# Patient Record
Sex: FEMALE | Race: BLACK OR AFRICAN AMERICAN | Employment: UNEMPLOYED | URBAN - METROPOLITAN AREA
[De-identification: names, ages, dates, MRNs, and addresses within clinical notes are randomized per-mention and may not be internally consistent; named-entity substitution may affect disease eponyms.]

---

## 2024-01-16 ENCOUNTER — APPOINTMENT (OUTPATIENT)
Dept: GENERAL RADIOLOGY | Age: 59
End: 2024-01-16
Payer: MEDICARE

## 2024-01-16 ENCOUNTER — HOSPITAL ENCOUNTER (EMERGENCY)
Age: 59
Discharge: HOME | End: 2024-01-16
Payer: MEDICARE

## 2024-01-16 VITALS
OXYGEN SATURATION: 98 % | TEMPERATURE: 98.9 F | RESPIRATION RATE: 14 BRPM | DIASTOLIC BLOOD PRESSURE: 102 MMHG | SYSTOLIC BLOOD PRESSURE: 113 MMHG | HEART RATE: 88 BPM

## 2024-01-16 DIAGNOSIS — R05.9 COUGH, UNSPECIFIED TYPE: Primary | ICD-10-CM

## 2024-01-16 DIAGNOSIS — Z20.828 EXPOSURE TO INFLUENZA: ICD-10-CM

## 2024-01-16 LAB
FLUAV RNA RESP QL NAA+PROBE: NOT DETECTED
FLUBV RNA RESP QL NAA+PROBE: NOT DETECTED
SARS-COV-2 RNA RESP QL NAA+PROBE: NOT DETECTED

## 2024-01-16 PROCEDURE — 87636 SARSCOV2 & INF A&B AMP PRB: CPT

## 2024-01-16 PROCEDURE — 99284 EMERGENCY DEPT VISIT MOD MDM: CPT

## 2024-01-16 PROCEDURE — 71045 X-RAY EXAM CHEST 1 VIEW: CPT

## 2024-01-16 RX ORDER — FUROSEMIDE 20 MG/1
10 TABLET ORAL
COMMUNITY

## 2024-01-16 RX ORDER — GUAIFENESIN 200 MG/10ML
200 LIQUID ORAL EVERY 4 HOURS PRN
COMMUNITY

## 2024-01-16 RX ORDER — ASPIRIN 81 MG/1
81 TABLET, CHEWABLE ORAL NIGHTLY
COMMUNITY

## 2024-01-16 RX ORDER — ZOLPIDEM TARTRATE 10 MG/1
10 TABLET ORAL NIGHTLY
COMMUNITY
Start: 2023-05-18

## 2024-01-16 RX ORDER — RISPERIDONE 1 MG/1
1 TABLET ORAL EVERY MORNING
COMMUNITY
Start: 2022-10-03

## 2024-01-16 RX ORDER — LOPERAMIDE HYDROCHLORIDE 2 MG/1
4 CAPSULE ORAL PRN
COMMUNITY

## 2024-01-16 RX ORDER — RISPERIDONE 2 MG/1
2 TABLET ORAL NIGHTLY
COMMUNITY
Start: 2022-10-03

## 2024-01-16 RX ORDER — DULOXETIN HYDROCHLORIDE 60 MG/1
60 CAPSULE, DELAYED RELEASE ORAL 2 TIMES DAILY
COMMUNITY
Start: 2022-10-03

## 2024-01-16 RX ORDER — LORATADINE 10 MG/1
10 TABLET ORAL EVERY MORNING
COMMUNITY

## 2024-01-16 RX ORDER — ASPIRIN 81 MG
100 TABLET, DELAYED RELEASE (ENTERIC COATED) ORAL DAILY
COMMUNITY

## 2024-01-16 ASSESSMENT — ENCOUNTER SYMPTOMS
DIARRHEA: 0
VOMITING: 0
COUGH: 1
RHINORRHEA: 0
SHORTNESS OF BREATH: 0
NAUSEA: 0
ABDOMINAL PAIN: 0

## 2024-01-17 NOTE — ED PROVIDER NOTES
well-appearing can be managed as nursing home facility.  Recommend that she get retested within 2 to 3 days if she continues with her symptoms.  She is to return to the ED for new or worsening symptoms, stable for discharge, suspicion is low at this time for pneumonia, pleural effusion, pneumothorax, acute Rester distress, hypoxemia or other emergent etiology    I am the Primary Clinician of Record.  FINAL IMPRESSION      1. Cough, unspecified type    2. Exposure to influenza          DISPOSITION/PLAN     DISPOSITION Decision To Discharge 01/16/2024 09:54:39 PM      PATIENT REFERRED TO:  [facility]  [phone]  Schedule an appointment as soon as possible for a visit in 2 days      [facility] Emergency Department  [address] [address]  [phone]    As needed, If symptoms worsen      DISCHARGE MEDICATIONS:  New Prescriptions    No medications on file       DISCONTINUED MEDICATIONS:  Discontinued Medications    No medications on file              (Please note that portions of this note were completed with a voice recognition program.  Efforts were made to edit the dictations but occasionally words are mis-transcribed.)    Shelby Quintana PA-C (electronically signed)        Shelby Quintana PA-C  01/16/24 [id number]

## 2024-03-02 ENCOUNTER — HOSPITAL ENCOUNTER (EMERGENCY)
Age: 59
Discharge: HOME | End: 2024-03-02
Attending: EMERGENCY MEDICINE
Payer: MEDICARE

## 2024-03-02 VITALS
HEART RATE: 65 BPM | TEMPERATURE: 97.9 F | SYSTOLIC BLOOD PRESSURE: 135 MMHG | HEIGHT: 55 IN | WEIGHT: 185 LBS | RESPIRATION RATE: 16 BRPM | DIASTOLIC BLOOD PRESSURE: 74 MMHG | OXYGEN SATURATION: 100 % | BODY MASS INDEX: 42.81 KG/M2

## 2024-03-02 DIAGNOSIS — N39.0 URINARY TRACT INFECTION IN FEMALE: Primary | ICD-10-CM

## 2024-03-02 LAB
ALBUMIN SERPL-MCNC: 3.8 G/DL (ref 3.4–5)
ALBUMIN/GLOB SERPL: 0.9 {RATIO} (ref 1.1–2.2)
ALP SERPL-CCNC: 89 U/L (ref 40–129)
ALT SERPL-CCNC: 28 U/L (ref 10–40)
ANION GAP SERPL CALCULATED.3IONS-SCNC: 10 MMOL/L (ref 3–16)
AST SERPL-CCNC: 40 U/L (ref 15–37)
BACTERIA URNS QL MICRO: ABNORMAL /HPF
BASOPHILS # BLD: 0.1 K/UL (ref 0–0.2)
BASOPHILS NFR BLD: 1 %
BILIRUB SERPL-MCNC: 0.3 MG/DL (ref 0–1)
BILIRUB UR QL STRIP.AUTO: NEGATIVE
BUN SERPL-MCNC: 17 MG/DL (ref 7–20)
CALCIUM SERPL-MCNC: 9.7 MG/DL (ref 8.3–10.6)
CHLORIDE SERPL-SCNC: 101 MMOL/L (ref 99–110)
CLARITY UR: ABNORMAL
CO2 SERPL-SCNC: 24 MMOL/L (ref 21–32)
COLOR UR: YELLOW
CREAT SERPL-MCNC: 0.7 MG/DL (ref 0.6–1.1)
DEPRECATED RDW RBC AUTO: 14.2 % (ref 12.4–15.4)
EOSINOPHIL # BLD: 0.1 K/UL (ref 0–0.6)
EOSINOPHIL NFR BLD: 2.2 %
EPI CELLS #/AREA URNS AUTO: 1 /HPF (ref 0–5)
GFR SERPLBLD CREATININE-BSD FMLA CKD-EPI: >60 ML/MIN/{1.73_M2}
GLUCOSE SERPL-MCNC: 85 MG/DL (ref 70–99)
GLUCOSE UR STRIP.AUTO-MCNC: NEGATIVE MG/DL
HCT VFR BLD AUTO: 37.9 % (ref 36–48)
HGB BLD-MCNC: 12.6 G/DL (ref 12–16)
HGB UR QL STRIP.AUTO: NEGATIVE
HYALINE CASTS #/AREA URNS AUTO: 0 /LPF (ref 0–8)
KETONES UR STRIP.AUTO-MCNC: NEGATIVE MG/DL
LEUKOCYTE ESTERASE UR QL STRIP.AUTO: ABNORMAL
LYMPHOCYTES # BLD: 1.5 K/UL (ref 1–5.1)
LYMPHOCYTES NFR BLD: 23.5 %
MCH RBC QN AUTO: 30 PG (ref 26–34)
MCHC RBC AUTO-ENTMCNC: 33.3 G/DL (ref 31–36)
MCV RBC AUTO: 90.2 FL (ref 80–100)
MONOCYTES # BLD: 0.7 K/UL (ref 0–1.3)
MONOCYTES NFR BLD: 10.8 %
NEUTROPHILS # BLD: 4 K/UL (ref 1.7–7.7)
NEUTROPHILS NFR BLD: 62.5 %
NITRITE UR QL STRIP.AUTO: POSITIVE
PH UR STRIP.AUTO: 6.5 [PH] (ref 5–8)
PLATELET # BLD AUTO: 311 K/UL (ref 135–450)
PMV BLD AUTO: 7 FL (ref 5–10.5)
POTASSIUM SERPL-SCNC: 5.4 MMOL/L (ref 3.5–5.1)
PROT SERPL-MCNC: 8 G/DL (ref 6.4–8.2)
PROT UR STRIP.AUTO-MCNC: NEGATIVE MG/DL
RBC # BLD AUTO: 4.2 M/UL (ref 4–5.2)
RBC CLUMPS #/AREA URNS AUTO: 0 /HPF (ref 0–4)
SODIUM SERPL-SCNC: 135 MMOL/L (ref 136–145)
SP GR UR STRIP.AUTO: 1.01 (ref 1–1.03)
UA COMPLETE W REFLEX CULTURE PNL UR: YES
UA DIPSTICK W REFLEX MICRO PNL UR: YES
URN SPEC COLLECT METH UR: ABNORMAL
UROBILINOGEN UR STRIP-ACNC: 0.2 E.U./DL
WBC # BLD AUTO: 6.4 K/UL (ref 4–11)
WBC #/AREA URNS AUTO: 110 /HPF (ref 0–5)

## 2024-03-02 PROCEDURE — 99284 EMERGENCY DEPT VISIT MOD MDM: CPT

## 2024-03-02 PROCEDURE — 87086 URINE CULTURE/COLONY COUNT: CPT

## 2024-03-02 PROCEDURE — 81001 URINALYSIS AUTO W/SCOPE: CPT

## 2024-03-02 PROCEDURE — 96365 THER/PROPH/DIAG IV INF INIT: CPT

## 2024-03-02 PROCEDURE — 85025 COMPLETE CBC W/AUTO DIFF WBC: CPT

## 2024-03-02 PROCEDURE — 87077 CULTURE AEROBIC IDENTIFY: CPT

## 2024-03-02 PROCEDURE — 36415 COLL VENOUS BLD VENIPUNCTURE: CPT

## 2024-03-02 PROCEDURE — 87186 SC STD MICRODIL/AGAR DIL: CPT

## 2024-03-02 PROCEDURE — 2580000003 HC RX 258: Performed by: EMERGENCY MEDICINE

## 2024-03-02 PROCEDURE — 80053 COMPREHEN METABOLIC PANEL: CPT

## 2024-03-02 PROCEDURE — 6360000002 HC RX W HCPCS: Performed by: EMERGENCY MEDICINE

## 2024-03-02 RX ORDER — CEFUROXIME AXETIL 500 MG/1
500 TABLET ORAL 2 TIMES DAILY
Qty: 14 TABLET | Refills: 0 | Status: SHIPPED | OUTPATIENT
Start: 2024-03-02 | End: 2024-03-09

## 2024-03-02 RX ADMIN — CEFTRIAXONE SODIUM 1000 MG: 1 INJECTION, POWDER, FOR SOLUTION INTRAMUSCULAR; INTRAVENOUS at 11:20

## 2024-03-02 NOTE — ED NOTES
Pt ambulated out to hallway with walker, patient was nervous her knees would buckle however did really well. Pt used walker and has a walker at home and wheelchair to get around while she doesn't feel good the next couple days.

## 2024-03-02 NOTE — ED PROVIDER NOTES
[facility] EMERGENCY DEPARTMENT  EMERGENCY DEPARTMENTENCOUNTER      Pt Name: Aster Jauregui  MRN: [id number]  Birthdate 1965  Date ofevaluation: 3/2/2024  Provider: Campbell Bhatia MD    CHIEF COMPLAINT       Chief Complaint   Patient presents with    Fall     [facility] patient states her legs keep buckling. Patient denies any loc, denies pain anywhere.        HPI    HISTORY OF PRESENT ILLNESS   (Location/Symptom, Timing/Onset,Context/Setting, Quality, Duration, Modifying Factors, Severity)  Note limiting factors.   Aster Jauregui is a 58 y.o. female who presents to the emergency department with weakness.  This is a 58-year-old female who states that over the last several days she feels like her legs are giving out.  She denies any fevers or chills.  The patient does have a history of urinary tract infections.  The patient states this happens when she walks long distances.  She denies any abdominal pain.        NursingNotes were reviewed.    Review of Systems    REVIEW OF SYSTEMS    (2-9 systems for level 4, 10 or more for level 5)     Review of Systems   Constitutional: Negative for fever.   HENT: Negative for rhinorrhea and sore throat.    Eyes: Negative for redness.   Respiratory: Negative for shortness of breath.    Cardiovascular: Negative for chest pain.   Gastrointestinal: Negative for abdominal pain.   Genitourinary: Negative for flank pain.   Neurological: Negative for headaches.   Hematological: Negative for adenopathy.   Psychiatric/Behavioral: Negative for confusion.              Except as noted above the remainder of the review of systems was reviewed and negative.       PAST MEDICAL HISTORY   History reviewed. No pertinent past medical history.      SURGICALHISTORY     History reviewed. No pertinent surgical history.      CURRENT MEDICATIONS       Previous Medications    ASPIRIN 81 MG CHEWABLE TABLET    Take 1 tablet by mouth nightly    DOCUSATE  SODIUM 100 MG TABS    Take 100 mg by mouth daily    DULOXETINE (CYMBALTA) 60 MG EXTENDED RELEASE CAPSULE    Take 1 capsule by mouth 2 times daily    FUROSEMIDE (LASIX) 20 MG TABLET    Take 0.5 tablets by mouth every 48 hours    GUAIFENESIN (ROBITUSSIN) 100 MG/5ML LIQUID    Take 10 mLs by mouth every 4 hours as needed    LOPERAMIDE (IMODIUM) 2 MG CAPSULE    Take 2 capsules by mouth as needed    LORATADINE (CLARITIN) 10 MG TABLET    Take 1 tablet by mouth every morning    RISPERIDONE (RISPERDAL) 1 MG TABLET    Take 1 tablet by mouth every morning    RISPERIDONE (RISPERDAL) 2 MG TABLET    Take 1 tablet by mouth nightly    SENNOSIDES 8.6 MG CAPS    Take 8.6 mg by mouth daily    ZOLPIDEM (AMBIEN) 10 MG TABLET    Take 1 tablet by mouth nightly.       ALLERGIES     Patient has no known allergies.    FAMILY HISTORY     History reviewed. No pertinent family history.       SOCIAL HISTORY       Social History     Socioeconomic History    Marital status: Unknown     Spouse name: None    Number of children: None    Years of education: None    Highest education level: None   Tobacco Use    Smoking status: Never     Passive exposure: Never    Smokeless tobacco: Never   Substance and Sexual Activity    Alcohol use: Never    Drug use: Never    Sexual activity: Never       SCREENINGS    Megan Coma Scale  Eye Opening: Spontaneous  Best Verbal Response: Oriented  Best Motor Response: Obeys commands  Megan Coma Scale Score: 15        PHYSICAL EXAM    (up to 7 for level 4, 8 or more for level 5)     ED Triage Vitals [03/02/24 0817]   BP Temp Temp src Pulse Respirations SpO2 Height Weight - Scale   135/74 97.9 °F (36.6 °C) -- 65 16 100 % 1.372 m (4' 6\") 83.9 kg (185 lb)       Physical Exam:      General Appearance:  Alert, cooperative, appears stated age.   Head:  Normocephalic, without obvious abnormality, atraumatic.   Eyes:  conjunctiva/corneas clear, EOM's intact.  Sclera anicteric.   ENT: Mucous membranes are moist and pink

## 2024-03-02 NOTE — ED NOTES
Reviewed discharge instructions with patient, patient verbalized understanding, used wheelchair to get to car for family to take back to [facility].

## 2024-03-03 LAB
BACTERIA UR CULT: ABNORMAL
ORGANISM: ABNORMAL

## 2024-03-04 LAB
BACTERIA UR CULT: ABNORMAL
ORGANISM: ABNORMAL

## 2024-03-05 ENCOUNTER — APPOINTMENT (OUTPATIENT)
Dept: CT IMAGING | Age: 59
End: 2024-03-05
Payer: MEDICARE

## 2024-03-05 ENCOUNTER — APPOINTMENT (OUTPATIENT)
Dept: GENERAL RADIOLOGY | Age: 59
End: 2024-03-05
Payer: MEDICARE

## 2024-03-05 ENCOUNTER — HOSPITAL ENCOUNTER (EMERGENCY)
Age: 59
Discharge: HOME | End: 2024-03-06
Attending: EMERGENCY MEDICINE
Payer: MEDICARE

## 2024-03-05 VITALS
HEART RATE: 67 BPM | OXYGEN SATURATION: 100 % | DIASTOLIC BLOOD PRESSURE: 70 MMHG | RESPIRATION RATE: 18 BRPM | SYSTOLIC BLOOD PRESSURE: 121 MMHG | TEMPERATURE: 97.5 F

## 2024-03-05 DIAGNOSIS — S09.90XA CLOSED HEAD INJURY, INITIAL ENCOUNTER: Primary | ICD-10-CM
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
## 2024-03-05 LAB
ALBUMIN SERPL-MCNC: 3.9 G/DL (ref 3.4–5)
ALBUMIN/GLOB SERPL: 0.9 {RATIO} (ref 1.1–2.2)
ALP SERPL-CCNC: 94 U/L (ref 40–129)
ALT SERPL-CCNC: 29 U/L (ref 10–40)
ANION GAP SERPL CALCULATED.3IONS-SCNC: 7 MMOL/L (ref 3–16)
AST SERPL-CCNC: 28 U/L (ref 15–37)
BACTERIA URNS QL MICRO: ABNORMAL /HPF
BASOPHILS # BLD: 0.1 K/UL (ref 0–0.2)
BASOPHILS NFR BLD: 1.1 %
BILIRUB SERPL-MCNC: <0.2 MG/DL (ref 0–1)
BILIRUB UR QL STRIP.AUTO: NEGATIVE
BUN SERPL-MCNC: 16 MG/DL (ref 7–20)
CALCIUM SERPL-MCNC: 9.9 MG/DL (ref 8.3–10.6)
CHLORIDE SERPL-SCNC: 101 MMOL/L (ref 99–110)
CLARITY UR: CLEAR
CO2 SERPL-SCNC: 28 MMOL/L (ref 21–32)
COLOR UR: YELLOW
CREAT SERPL-MCNC: 0.9 MG/DL (ref 0.6–1.1)
DEPRECATED RDW RBC AUTO: 14.1 % (ref 12.4–15.4)
EOSINOPHIL # BLD: 0.2 K/UL (ref 0–0.6)
EOSINOPHIL NFR BLD: 2.6 %
EPI CELLS #/AREA URNS AUTO: 0 /HPF (ref 0–5)
GFR SERPLBLD CREATININE-BSD FMLA CKD-EPI: >60 ML/MIN/{1.73_M2}
GLUCOSE SERPL-MCNC: 96 MG/DL (ref 70–99)
GLUCOSE UR STRIP.AUTO-MCNC: NEGATIVE MG/DL
HCT VFR BLD AUTO: 38.4 % (ref 36–48)
HGB BLD-MCNC: 12.9 G/DL (ref 12–16)
HGB UR QL STRIP.AUTO: NEGATIVE
HYALINE CASTS #/AREA URNS AUTO: 0 /LPF (ref 0–8)
KETONES UR STRIP.AUTO-MCNC: NEGATIVE MG/DL
LACTATE BLDV-SCNC: 1.7 MMOL/L (ref 0.4–1.9)
LEUKOCYTE ESTERASE UR QL STRIP.AUTO: ABNORMAL
LYMPHOCYTES # BLD: 2 K/UL (ref 1–5.1)
LYMPHOCYTES NFR BLD: 31.4 %
MCH RBC QN AUTO: 30.1 PG (ref 26–34)
MCHC RBC AUTO-ENTMCNC: 33.6 G/DL (ref 31–36)
MCV RBC AUTO: 89.5 FL (ref 80–100)
MONOCYTES # BLD: 0.6 K/UL (ref 0–1.3)
MONOCYTES NFR BLD: 8.8 %
NEUTROPHILS # BLD: 3.6 K/UL (ref 1.7–7.7)
NEUTROPHILS NFR BLD: 56.1 %
NITRITE UR QL STRIP.AUTO: NEGATIVE
NT-PROBNP SERPL-MCNC: 41 PG/ML (ref 0–124)
PH UR STRIP.AUTO: 7 [PH] (ref 5–8)
PLATELET # BLD AUTO: 335 K/UL (ref 135–450)
PMV BLD AUTO: 7.1 FL (ref 5–10.5)
POTASSIUM SERPL-SCNC: 4.1 MMOL/L (ref 3.5–5.1)
PROCALCITONIN SERPL IA-MCNC: 0.06 NG/ML (ref 0–0.15)
PROT SERPL-MCNC: 8.1 G/DL (ref 6.4–8.2)
PROT UR STRIP.AUTO-MCNC: NEGATIVE MG/DL
RBC # BLD AUTO: 4.29 M/UL (ref 4–5.2)
RBC CLUMPS #/AREA URNS AUTO: 0 /HPF (ref 0–4)
REASON FOR REJECTION: NORMAL
REJECTED TEST: NORMAL
SODIUM SERPL-SCNC: 136 MMOL/L (ref 136–145)
SP GR UR STRIP.AUTO: 1.01 (ref 1–1.03)
TROPONIN, HIGH SENSITIVITY: 13 NG/L (ref 0–14)
TROPONIN, HIGH SENSITIVITY: 14 NG/L (ref 0–14)
UA COMPLETE W REFLEX CULTURE PNL UR: YES
UA DIPSTICK W REFLEX MICRO PNL UR: YES
URN SPEC COLLECT METH UR: ABNORMAL
UROBILINOGEN UR STRIP-ACNC: 0.2 E.U./DL
WBC # BLD AUTO: 6.3 K/UL (ref 4–11)
WBC #/AREA URNS AUTO: 16 /HPF (ref 0–5)

## 2024-03-05 PROCEDURE — 99285 EMERGENCY DEPT VISIT HI MDM: CPT

## 2024-03-05 PROCEDURE — 80053 COMPREHEN METABOLIC PANEL: CPT

## 2024-03-05 PROCEDURE — 72125 CT NECK SPINE W/O DYE: CPT

## 2024-03-05 PROCEDURE — 87086 URINE CULTURE/COLONY COUNT: CPT

## 2024-03-05 PROCEDURE — 83880 ASSAY OF NATRIURETIC PEPTIDE: CPT

## 2024-03-05 PROCEDURE — 81001 URINALYSIS AUTO W/SCOPE: CPT

## 2024-03-05 PROCEDURE — 6370000000 HC RX 637 (ALT 250 FOR IP): Performed by: PHYSICIAN ASSISTANT

## 2024-03-05 PROCEDURE — 71045 X-RAY EXAM CHEST 1 VIEW: CPT

## 2024-03-05 PROCEDURE — 84484 ASSAY OF TROPONIN QUANT: CPT

## 2024-03-05 PROCEDURE — 83605 ASSAY OF LACTIC ACID: CPT

## 2024-03-05 PROCEDURE — 93005 ELECTROCARDIOGRAM TRACING: CPT | Performed by: PHYSICIAN ASSISTANT

## 2024-03-05 PROCEDURE — 70486 CT MAXILLOFACIAL W/O DYE: CPT

## 2024-03-05 PROCEDURE — 85025 COMPLETE CBC W/AUTO DIFF WBC: CPT

## 2024-03-05 PROCEDURE — 84145 PROCALCITONIN (PCT): CPT

## 2024-03-05 PROCEDURE — 70450 CT HEAD/BRAIN W/O DYE: CPT

## 2024-03-05 RX ORDER — ACETAMINOPHEN 500 MG
1000 TABLET ORAL ONCE
Status: COMPLETED | OUTPATIENT
Start: 2024-03-05 | End: 2024-03-05

## 2024-03-05 RX ADMIN — ACETAMINOPHEN 1000 MG: 500 TABLET ORAL at 20:55

## 2024-03-05 ASSESSMENT — ENCOUNTER SYMPTOMS
DIARRHEA: 0
COUGH: 0
RHINORRHEA: 0
SHORTNESS OF BREATH: 0
ABDOMINAL PAIN: 0
NAUSEA: 0
VOMITING: 0

## 2024-03-05 ASSESSMENT — PAIN DESCRIPTION - LOCATION
LOCATION: GENERALIZED
LOCATION: GENERALIZED

## 2024-03-05 ASSESSMENT — PAIN DESCRIPTION - DESCRIPTORS
DESCRIPTORS: ACHING
DESCRIPTORS: ACHING

## 2024-03-05 ASSESSMENT — PAIN SCALES - GENERAL
PAINLEVEL_OUTOF10: 2
PAINLEVEL_OUTOF10: 10
PAINLEVEL_OUTOF10: 3

## 2024-03-05 ASSESSMENT — PAIN - FUNCTIONAL ASSESSMENT: PAIN_FUNCTIONAL_ASSESSMENT: 0-10

## 2024-03-05 ASSESSMENT — LIFESTYLE VARIABLES
HOW MANY STANDARD DRINKS CONTAINING ALCOHOL DO YOU HAVE ON A TYPICAL DAY: PATIENT DOES NOT DRINK
HOW OFTEN DO YOU HAVE A DRINK CONTAINING ALCOHOL: NEVER

## 2024-03-06 LAB
EKG ATRIAL RATE: 66 BPM
EKG DIAGNOSIS: NORMAL
EKG P AXIS: 31 DEGREES
EKG P-R INTERVAL: 132 MS
EKG Q-T INTERVAL: 446 MS
EKG QRS DURATION: 90 MS
EKG QTC CALCULATION (BAZETT): 467 MS
EKG R AXIS: 23 DEGREES
EKG T AXIS: 32 DEGREES
EKG VENTRICULAR RATE: 66 BPM

## 2024-03-06 PROCEDURE — 93010 ELECTROCARDIOGRAM REPORT: CPT | Performed by: INTERNAL MEDICINE

## 2024-03-06 NOTE — ED PROVIDER NOTES
was regular rate rhythm with no murmurs rubs gallops.  She was neurologic tact with no focal motor or sensory deficits throughout.      Medical decision making:    The patient has remained stable throughout hospital course.  Her evaluation was unremarkable and normal.  Multiple imaging studies were obtained including her head and neck and her chest that are unremarkable and normal.  A cardiac workup was normal.  A urinalysis was obtained that shows a improving urinary tract infection.  The patient was discharged with appropriate follow-up and instructions to return if worse.    Is this patient to be included in the SEP-1 Core Measure due to severe sepsis or septic shock?   No   Exclusion criteria - the patient is NOT to be included for SEP-1 Core Measure due to:  Infection is not suspected      FINAL IMPRESSION:    1. Closed head injury, initial encounter    2. Fall, initial encounter            Campbell Bhatia MD  03/05/24 1067    
return.    Final Impression    1. Closed head injury, initial encounter    2. Fall, initial encounter        Discharge Vital Signs:  Blood pressure 135/77, pulse 67, temperature 97.5 °F (36.4 °C), temperature source Oral, resp. rate 18, SpO2 100 %.      I am the Primary Clinician of Record.  FINAL IMPRESSION      1. Closed head injury, initial encounter    2. Fall, initial encounter          DISPOSITION/PLAN     DISPOSITION Decision To Discharge 03/05/2024 11:13:23 PM      PATIENT REFERRED TO:  [facility] Pre-Services  [phone]  Schedule an appointment as soon as possible for a visit in 2 days      [facility] Emergency Department  [address] [address]  [phone]    As needed, If symptoms worsen      DISCHARGE MEDICATIONS:  New Prescriptions    MISC. DEVICES (WALKER) MISC    1 each by Does not apply route daily       DISCONTINUED MEDICATIONS:  Discontinued Medications    No medications on file              (Please note that portions of this note were completed with a voice recognition program.  Efforts were made to edit the dictations but occasionally words are mis-transcribed.)    Shelby Quintana PA-C (electronically signed)        Shelby Quintana PA-C  03/05/24 6288

## 2024-03-06 NOTE — ED NOTES
Transport to be here around 0130 to transport patient home. Snacks provided to patient. Updated on  time. Call light in reach. Bed in lowest position.

## 2024-03-07 LAB — BACTERIA UR CULT: NORMAL

## 2024-05-13 ENCOUNTER — HOSPITAL ENCOUNTER (EMERGENCY)
Age: 59
Discharge: HOME | DRG: 313 | End: 2024-05-14
Payer: MEDICARE

## 2024-05-13 DIAGNOSIS — R07.9 CHEST PAIN, UNSPECIFIED TYPE: Primary | ICD-10-CM

## 2024-05-13 PROCEDURE — 99285 EMERGENCY DEPT VISIT HI MDM: CPT

## 2024-05-13 PROCEDURE — 93005 ELECTROCARDIOGRAM TRACING: CPT | Performed by: NURSE PRACTITIONER

## 2024-05-13 ASSESSMENT — LIFESTYLE VARIABLES
HOW OFTEN DO YOU HAVE A DRINK CONTAINING ALCOHOL: NEVER
HOW MANY STANDARD DRINKS CONTAINING ALCOHOL DO YOU HAVE ON A TYPICAL DAY: PATIENT DOES NOT DRINK

## 2024-05-13 ASSESSMENT — PAIN DESCRIPTION - LOCATION: LOCATION: CHEST

## 2024-05-13 ASSESSMENT — PAIN - FUNCTIONAL ASSESSMENT: PAIN_FUNCTIONAL_ASSESSMENT: 0-10

## 2024-05-13 ASSESSMENT — PAIN SCALES - GENERAL: PAINLEVEL_OUTOF10: 10

## 2024-05-13 ASSESSMENT — PAIN DESCRIPTION - DESCRIPTORS: DESCRIPTORS: TIGHTNESS;PRESSURE

## 2024-05-14 ENCOUNTER — APPOINTMENT (OUTPATIENT)
Dept: GENERAL RADIOLOGY | Age: 59
DRG: 313 | End: 2024-05-14
Payer: MEDICARE

## 2024-05-14 ENCOUNTER — APPOINTMENT (OUTPATIENT)
Dept: CT IMAGING | Age: 59
DRG: 313 | End: 2024-05-14
Payer: MEDICARE

## 2024-05-14 VITALS
BODY MASS INDEX: 41.89 KG/M2 | RESPIRATION RATE: 15 BRPM | HEIGHT: 55 IN | HEART RATE: 53 BPM | WEIGHT: 181 LBS | DIASTOLIC BLOOD PRESSURE: 66 MMHG | OXYGEN SATURATION: 100 % | TEMPERATURE: 97.7 F | SYSTOLIC BLOOD PRESSURE: 124 MMHG

## 2024-05-14 LAB
ALBUMIN SERPL-MCNC: 3.4 G/DL (ref 3.4–5)
ALBUMIN/GLOB SERPL: 0.9 {RATIO} (ref 1.1–2.2)
ALP SERPL-CCNC: 103 U/L (ref 40–129)
ALT SERPL-CCNC: 45 U/L (ref 10–40)
ANION GAP SERPL CALCULATED.3IONS-SCNC: 7 MMOL/L (ref 3–16)
AST SERPL-CCNC: 41 U/L (ref 15–37)
BASOPHILS # BLD: 0.1 K/UL (ref 0–0.2)
BASOPHILS NFR BLD: 0.9 %
BILIRUB SERPL-MCNC: <0.2 MG/DL (ref 0–1)
BUN SERPL-MCNC: 15 MG/DL (ref 7–20)
CALCIUM SERPL-MCNC: 9.5 MG/DL (ref 8.3–10.6)
CHLORIDE SERPL-SCNC: 103 MMOL/L (ref 99–110)
CO2 SERPL-SCNC: 27 MMOL/L (ref 21–32)
CREAT SERPL-MCNC: 0.8 MG/DL (ref 0.6–1.1)
D DIMER: 0.53 UG/ML FEU (ref 0–0.6)
DEPRECATED RDW RBC AUTO: 13.2 % (ref 12.4–15.4)
EKG ATRIAL RATE: 57 BPM
EKG DIAGNOSIS: NORMAL
EKG P AXIS: 32 DEGREES
EKG P-R INTERVAL: 148 MS
EKG Q-T INTERVAL: 464 MS
EKG QRS DURATION: 90 MS
EKG QTC CALCULATION (BAZETT): 451 MS
EKG R AXIS: 26 DEGREES
EKG T AXIS: 19 DEGREES
EKG VENTRICULAR RATE: 57 BPM
EOSINOPHIL # BLD: 0.2 K/UL (ref 0–0.6)
EOSINOPHIL NFR BLD: 2.8 %
GFR SERPLBLD CREATININE-BSD FMLA CKD-EPI: 85 ML/MIN/{1.73_M2}
GLUCOSE SERPL-MCNC: 93 MG/DL (ref 70–99)
HCT VFR BLD AUTO: 33.8 % (ref 36–48)
HGB BLD-MCNC: 11.3 G/DL (ref 12–16)
LYMPHOCYTES # BLD: 1.9 K/UL (ref 1–5.1)
LYMPHOCYTES NFR BLD: 32 %
MCH RBC QN AUTO: 30.3 PG (ref 26–34)
MCHC RBC AUTO-ENTMCNC: 33.5 G/DL (ref 31–36)
MCV RBC AUTO: 90.4 FL (ref 80–100)
MONOCYTES # BLD: 0.7 K/UL (ref 0–1.3)
MONOCYTES NFR BLD: 11.7 %
NEUTROPHILS # BLD: 3.1 K/UL (ref 1.7–7.7)
NEUTROPHILS NFR BLD: 52.6 %
NT-PROBNP SERPL-MCNC: 41 PG/ML (ref 0–124)
PLATELET # BLD AUTO: 303 K/UL (ref 135–450)
PMV BLD AUTO: 7.1 FL (ref 5–10.5)
POTASSIUM SERPL-SCNC: 5.1 MMOL/L (ref 3.5–5.1)
PROT SERPL-MCNC: 7.2 G/DL (ref 6.4–8.2)
RBC # BLD AUTO: 3.74 M/UL (ref 4–5.2)
SODIUM SERPL-SCNC: 137 MMOL/L (ref 136–145)
TROPONIN, HIGH SENSITIVITY: 11 NG/L (ref 0–14)
TROPONIN, HIGH SENSITIVITY: 12 NG/L (ref 0–14)
WBC # BLD AUTO: 5.9 K/UL (ref 4–11)

## 2024-05-14 PROCEDURE — 71260 CT THORAX DX C+: CPT

## 2024-05-14 PROCEDURE — 71045 X-RAY EXAM CHEST 1 VIEW: CPT

## 2024-05-14 PROCEDURE — 85379 FIBRIN DEGRADATION QUANT: CPT

## 2024-05-14 PROCEDURE — 84484 ASSAY OF TROPONIN QUANT: CPT

## 2024-05-14 PROCEDURE — 80053 COMPREHEN METABOLIC PANEL: CPT

## 2024-05-14 PROCEDURE — 83880 ASSAY OF NATRIURETIC PEPTIDE: CPT

## 2024-05-14 PROCEDURE — 93010 ELECTROCARDIOGRAM REPORT: CPT | Performed by: INTERNAL MEDICINE

## 2024-05-14 PROCEDURE — 85025 COMPLETE CBC W/AUTO DIFF WBC: CPT

## 2024-05-14 PROCEDURE — 6360000004 HC RX CONTRAST MEDICATION: Performed by: NURSE PRACTITIONER

## 2024-05-14 RX ORDER — ASPIRIN 81 MG/1
324 TABLET, CHEWABLE ORAL ONCE
Status: DISCONTINUED | OUTPATIENT
Start: 2024-05-14 | End: 2024-05-14 | Stop reason: HOSPADM

## 2024-05-14 RX ORDER — ONDANSETRON 4 MG/1
4 TABLET, ORALLY DISINTEGRATING ORAL EVERY 4 HOURS PRN
COMMUNITY

## 2024-05-14 RX ORDER — ALBUTEROL SULFATE 90 UG/1
2 AEROSOL, METERED RESPIRATORY (INHALATION) EVERY 6 HOURS PRN
COMMUNITY

## 2024-05-14 RX ORDER — DICLOFENAC SODIUM 75 MG/1
75 TABLET, DELAYED RELEASE ORAL 2 TIMES DAILY PRN
COMMUNITY

## 2024-05-14 RX ORDER — MIRTAZAPINE 15 MG/1
15 TABLET, FILM COATED ORAL NIGHTLY
COMMUNITY

## 2024-05-14 RX ORDER — OMEPRAZOLE 20 MG/1
20 CAPSULE, DELAYED RELEASE ORAL EVERY OTHER DAY
COMMUNITY

## 2024-05-14 RX ORDER — NITROGLYCERIN 0.4 MG/1
0.4 TABLET SUBLINGUAL EVERY 5 MIN PRN
COMMUNITY

## 2024-05-14 RX ORDER — FLUTICASONE FUROATE, UMECLIDINIUM BROMIDE AND VILANTEROL TRIFENATATE 200; 62.5; 25 UG/1; UG/1; UG/1
1 POWDER RESPIRATORY (INHALATION) DAILY
COMMUNITY

## 2024-05-14 RX ORDER — LOSARTAN POTASSIUM 100 MG/1
100 TABLET ORAL DAILY
COMMUNITY

## 2024-05-14 RX ORDER — ACETAMINOPHEN 325 MG/1
650 TABLET ORAL EVERY 4 HOURS PRN
COMMUNITY

## 2024-05-14 RX ORDER — OXYBUTYNIN CHLORIDE 10 MG/1
20 TABLET, EXTENDED RELEASE ORAL EVERY OTHER DAY
COMMUNITY

## 2024-05-14 RX ADMIN — IOPAMIDOL 75 ML: 755 INJECTION, SOLUTION INTRAVENOUS at 03:08

## 2024-05-14 ASSESSMENT — ENCOUNTER SYMPTOMS
SHORTNESS OF BREATH: 0
ABDOMINAL PAIN: 0
DIARRHEA: 0
CHEST TIGHTNESS: 0
VOMITING: 0
NAUSEA: 0

## 2024-05-14 ASSESSMENT — HEART SCORE: ECG: NORMAL

## 2024-05-14 NOTE — ED PROVIDER NOTES
[facility] EMERGENCY DEPARTMENT  EMERGENCY DEPARTMENT ENCOUNTER        Pt Name: Aster Jauregui  MRN: [id number]  Birthdate 1965  Date of evaluation: 5/13/2024  Provider: HERACLIO Anders CNP  PCP: No primary care provider on file.  Note Started: 12:44 AM EDT 5/14/24      EDDI. I have evaluated this patient.        CHIEF COMPLAINT       Chief Complaint   Patient presents with    Chest Pain     Pt to ED via [place] ems from [facility] with c/o chest pain that started today. Pt received 324 ASA prior to arrival.        HISTORY OF PRESENT ILLNESS: 1 or more Elements     History from : Patient    Limitations to history : History of intellectual delay, psychotic disorder, lives in group home    Aster Jauregui is a 58 y.o. female who presents to the emergency department with complaint of sharp/dull chest pain that began this evening while at rest, the patient reports that she was \"in the bed\".  Nothing makes the pain better or worse.  Denies history of chest pain.  Also denies history of any heart conditions.    Denies any headache, fever, lightheadedness, dizziness, visual disturbances.  No neck or back pain.  No shortness of breath, cough, or congestion.  No abdominal pain, nausea, vomiting, diarrhea, constipation, or dysuria.  No rash.    Nursing Notes were all reviewed and agreed with or any disagreements were addressed in the HPI.    REVIEW OF SYSTEMS :      Review of Systems   Constitutional:  Negative for activity change, chills and fever.   Respiratory:  Negative for chest tightness and shortness of breath.    Cardiovascular:  Positive for chest pain.   Gastrointestinal:  Negative for abdominal pain, diarrhea, nausea and vomiting.   Genitourinary:  Negative for dysuria.   All other systems reviewed and are negative.      Positives and Pertinent negatives as per HPI.     SURGICAL HISTORY   No past surgical history on file.    CURRENTMEDICATIONS       Previous Medications  Sharon YUEN MD (05/14/24 03:57:39)                Impression:    1. No pulmonary embolus.  2. Left lower lobe atelectasis.  3. Patchy areas of ground-glass throughout the lungs are nonspecific and may  represent atelectasis as imaging was obtained during expiratory phase versus  mild edema.  4. Cardiomegaly with small pericardial effusion.              Heart score 2.  Patient will follow-up outpatient.    MEDICAL DECISION MAKING:   I considered, but did not perform, additional testing such CT cardiac, as well as admission or transfer to a higher level of care.     I utilized an evidence-based risk rating tool (CMT) along with my training and experience to weigh the risk of discharge against the risks of further testing, imaging, or hospitalization. At this time, I estimate the risks of additional testing, imaging, or hospitalization to be equal to or greater than the risk of discharge(in the case of discharge home).      The patient's HEART Score is 2. In rare cases, I give patients with HEART Score of 4 the option of discharge, but only when they meet criteria for \"Low 4,\" meaning that HST was used, and the 4 is not from a highly suspicious story, highly suspicious EKG, or positive cardiac enzymes.  In these selected cases, the risk of a \"Low 4\" is still most likely lower than the risk of admission and further testing/imaging. [id number]    SHARED DECISION MAKING:   I discussed my risk assessment with the patient. The patient understands and consents to the risk of disposition/plan, as well as the risk of uncertainty in estimating outcomes. [id number]            Disposition Considerations (include 1 Tests not done, Shared Decision Making, Pt Expectation of Test or Tx.): Shared decision making: Initial differential diagnoses were discussed with this patient, along with physical exam findings and an explanation what evaluation studies were necessary and why. Labs and Imaging results were explained to the

## 2024-05-14 NOTE — ED NOTES
Update given to RN at [facility]. All questions and concerns answered at this time.

## 2024-05-15 ENCOUNTER — APPOINTMENT (OUTPATIENT)
Dept: CT IMAGING | Age: 59
DRG: 313 | End: 2024-05-15
Payer: MEDICARE

## 2024-05-15 ENCOUNTER — HOSPITAL ENCOUNTER (INPATIENT)
Age: 59
LOS: 1 days | Discharge: HOME | DRG: 313 | End: 2024-05-18
Attending: EMERGENCY MEDICINE | Admitting: STUDENT IN AN ORGANIZED HEALTH CARE EDUCATION/TRAINING PROGRAM
Payer: MEDICARE

## 2024-05-15 ENCOUNTER — APPOINTMENT (OUTPATIENT)
Dept: GENERAL RADIOLOGY | Age: 59
DRG: 313 | End: 2024-05-15
Payer: MEDICARE

## 2024-05-15 DIAGNOSIS — I20.9 ANGINA PECTORIS (HCC): ICD-10-CM

## 2024-05-15 DIAGNOSIS — R07.9 CHEST PAIN, UNSPECIFIED TYPE: ICD-10-CM

## 2024-05-15 DIAGNOSIS — R07.89 CHEST DISCOMFORT: Primary | ICD-10-CM

## 2024-05-15 LAB
ALBUMIN SERPL-MCNC: 3.9 G/DL (ref 3.4–5)
ALBUMIN/GLOB SERPL: 1 {RATIO} (ref 1.1–2.2)
ALP SERPL-CCNC: 117 U/L (ref 40–129)
ALT SERPL-CCNC: 38 U/L (ref 10–40)
ANION GAP SERPL CALCULATED.3IONS-SCNC: 11 MMOL/L (ref 3–16)
AST SERPL-CCNC: 28 U/L (ref 15–37)
BASOPHILS # BLD: 0.1 K/UL (ref 0–0.2)
BASOPHILS NFR BLD: 1 %
BILIRUB SERPL-MCNC: 0.3 MG/DL (ref 0–1)
BUN SERPL-MCNC: 15 MG/DL (ref 7–20)
CALCIUM SERPL-MCNC: 10.1 MG/DL (ref 8.3–10.6)
CHLORIDE SERPL-SCNC: 100 MMOL/L (ref 99–110)
CO2 SERPL-SCNC: 27 MMOL/L (ref 21–32)
CREAT SERPL-MCNC: 0.7 MG/DL (ref 0.6–1.1)
DEPRECATED RDW RBC AUTO: 13.5 % (ref 12.4–15.4)
EOSINOPHIL # BLD: 0.2 K/UL (ref 0–0.6)
EOSINOPHIL NFR BLD: 2.6 %
GFR SERPLBLD CREATININE-BSD FMLA CKD-EPI: >90 ML/MIN/{1.73_M2}
GLUCOSE SERPL-MCNC: 100 MG/DL (ref 70–99)
HCT VFR BLD AUTO: 36 % (ref 36–48)
HGB BLD-MCNC: 12.5 G/DL (ref 12–16)
LIPASE SERPL-CCNC: 22 U/L (ref 13–60)
LYMPHOCYTES # BLD: 1.6 K/UL (ref 1–5.1)
LYMPHOCYTES NFR BLD: 25.6 %
MCH RBC QN AUTO: 31.1 PG (ref 26–34)
MCHC RBC AUTO-ENTMCNC: 34.8 G/DL (ref 31–36)
MCV RBC AUTO: 89.4 FL (ref 80–100)
MONOCYTES # BLD: 0.5 K/UL (ref 0–1.3)
MONOCYTES NFR BLD: 8.5 %
NEUTROPHILS # BLD: 3.9 K/UL (ref 1.7–7.7)
NEUTROPHILS NFR BLD: 62.3 %
NT-PROBNP SERPL-MCNC: 57 PG/ML (ref 0–124)
PLATELET # BLD AUTO: 343 K/UL (ref 135–450)
PMV BLD AUTO: 7 FL (ref 5–10.5)
POTASSIUM SERPL-SCNC: 4.4 MMOL/L (ref 3.5–5.1)
PROT SERPL-MCNC: 7.9 G/DL (ref 6.4–8.2)
RBC # BLD AUTO: 4.03 M/UL (ref 4–5.2)
SODIUM SERPL-SCNC: 138 MMOL/L (ref 136–145)
TROPONIN, HIGH SENSITIVITY: 8 NG/L (ref 0–14)
TROPONIN, HIGH SENSITIVITY: 9 NG/L (ref 0–14)
WBC # BLD AUTO: 6.3 K/UL (ref 4–11)

## 2024-05-15 PROCEDURE — G0378 HOSPITAL OBSERVATION PER HR: HCPCS

## 2024-05-15 PROCEDURE — 2580000003 HC RX 258: Performed by: PHYSICIAN ASSISTANT

## 2024-05-15 PROCEDURE — 84484 ASSAY OF TROPONIN QUANT: CPT

## 2024-05-15 PROCEDURE — 80053 COMPREHEN METABOLIC PANEL: CPT

## 2024-05-15 PROCEDURE — 99285 EMERGENCY DEPT VISIT HI MDM: CPT

## 2024-05-15 PROCEDURE — 71045 X-RAY EXAM CHEST 1 VIEW: CPT

## 2024-05-15 PROCEDURE — 96374 THER/PROPH/DIAG INJ IV PUSH: CPT

## 2024-05-15 PROCEDURE — 83690 ASSAY OF LIPASE: CPT

## 2024-05-15 PROCEDURE — 96361 HYDRATE IV INFUSION ADD-ON: CPT

## 2024-05-15 PROCEDURE — 93005 ELECTROCARDIOGRAM TRACING: CPT | Performed by: EMERGENCY MEDICINE

## 2024-05-15 PROCEDURE — 96375 TX/PRO/DX INJ NEW DRUG ADDON: CPT

## 2024-05-15 PROCEDURE — 6360000002 HC RX W HCPCS: Performed by: PHYSICIAN ASSISTANT

## 2024-05-15 PROCEDURE — 83880 ASSAY OF NATRIURETIC PEPTIDE: CPT

## 2024-05-15 PROCEDURE — 70450 CT HEAD/BRAIN W/O DYE: CPT

## 2024-05-15 PROCEDURE — 85025 COMPLETE CBC W/AUTO DIFF WBC: CPT

## 2024-05-15 PROCEDURE — 6370000000 HC RX 637 (ALT 250 FOR IP): Performed by: PHYSICIAN ASSISTANT

## 2024-05-15 RX ORDER — 0.9 % SODIUM CHLORIDE 0.9 %
1000 INTRAVENOUS SOLUTION INTRAVENOUS ONCE
Status: COMPLETED | OUTPATIENT
Start: 2024-05-15 | End: 2024-05-15

## 2024-05-15 RX ORDER — ASPIRIN 81 MG/1
324 TABLET, CHEWABLE ORAL ONCE
Status: COMPLETED | OUTPATIENT
Start: 2024-05-15 | End: 2024-05-15

## 2024-05-15 RX ORDER — DIPHENHYDRAMINE HYDROCHLORIDE 50 MG/ML
25 INJECTION INTRAMUSCULAR; INTRAVENOUS ONCE
Status: COMPLETED | OUTPATIENT
Start: 2024-05-15 | End: 2024-05-15

## 2024-05-15 RX ORDER — METOCLOPRAMIDE HYDROCHLORIDE 5 MG/ML
10 INJECTION INTRAMUSCULAR; INTRAVENOUS ONCE
Status: COMPLETED | OUTPATIENT
Start: 2024-05-15 | End: 2024-05-15

## 2024-05-15 RX ADMIN — SODIUM CHLORIDE 1000 ML: 9 INJECTION, SOLUTION INTRAVENOUS at 20:24

## 2024-05-15 RX ADMIN — ASPIRIN 324 MG: 81 TABLET, CHEWABLE ORAL at 22:53

## 2024-05-15 RX ADMIN — METOCLOPRAMIDE 10 MG: 5 INJECTION, SOLUTION INTRAMUSCULAR; INTRAVENOUS at 20:26

## 2024-05-15 RX ADMIN — LIDOCAINE HYDROCHLORIDE: 20 SOLUTION ORAL at 20:22

## 2024-05-15 RX ADMIN — DIPHENHYDRAMINE HYDROCHLORIDE 25 MG: 50 INJECTION INTRAMUSCULAR; INTRAVENOUS at 20:26

## 2024-05-15 ASSESSMENT — ENCOUNTER SYMPTOMS
ABDOMINAL PAIN: 0
COUGH: 0
COLOR CHANGE: 0
SHORTNESS OF BREATH: 1
CONSTIPATION: 0
DIARRHEA: 0
NAUSEA: 0
CHEST TIGHTNESS: 1

## 2024-05-15 ASSESSMENT — PAIN SCALES - GENERAL: PAINLEVEL_OUTOF10: 10

## 2024-05-15 ASSESSMENT — PAIN - FUNCTIONAL ASSESSMENT: PAIN_FUNCTIONAL_ASSESSMENT: 0-10

## 2024-05-15 ASSESSMENT — PAIN DESCRIPTION - LOCATION: LOCATION: CHEST;HEAD

## 2024-05-15 ASSESSMENT — HEART SCORE: ECG: NORMAL

## 2024-05-15 ASSESSMENT — PAIN DESCRIPTION - DESCRIPTORS: DESCRIPTORS: ACHING

## 2024-05-16 ENCOUNTER — APPOINTMENT (OUTPATIENT)
Age: 59
DRG: 313 | End: 2024-05-16
Attending: INTERNAL MEDICINE
Payer: MEDICARE

## 2024-05-16 LAB
ANION GAP SERPL CALCULATED.3IONS-SCNC: 8 MMOL/L (ref 3–16)
BUN SERPL-MCNC: 16 MG/DL (ref 7–20)
CALCIUM SERPL-MCNC: 9.3 MG/DL (ref 8.3–10.6)
CHLORIDE SERPL-SCNC: 105 MMOL/L (ref 99–110)
CHOLEST SERPL-MCNC: 127 MG/DL (ref 0–199)
CO2 SERPL-SCNC: 25 MMOL/L (ref 21–32)
CREAT SERPL-MCNC: 0.7 MG/DL (ref 0.6–1.1)
CRP SERPL-MCNC: 16.4 MG/L (ref 0–5.1)
DEPRECATED RDW RBC AUTO: 13.3 % (ref 12.4–15.4)
ECHO AO ASC DIAM: 3.1 CM
ECHO AO ASCENDING AORTA INDEX: 1.83 CM/M2
ECHO AO ROOT DIAM: 2.7 CM
ECHO AO ROOT INDEX: 1.6 CM/M2
ECHO AV AREA PEAK VELOCITY: 0.9 CM2
ECHO AV AREA VTI: 0.9 CM2
ECHO AV AREA/BSA PEAK VELOCITY: 0.5 CM2/M2
ECHO AV AREA/BSA VTI: 0.5 CM2/M2
ECHO AV MEAN GRADIENT: 5 MMHG
ECHO AV MEAN VELOCITY: 1 M/S
ECHO AV PEAK GRADIENT: 8 MMHG
ECHO AV PEAK VELOCITY: 1.5 M/S
ECHO AV VELOCITY RATIO: 0.67
ECHO AV VTI: 36.9 CM
ECHO BSA: 1.81 M2
ECHO EST RA PRESSURE: 3 MMHG
ECHO IVC INSP: 0.4 CM
ECHO IVC PROX: 1.4 CM
ECHO LA AREA 2C: 16.5 CM2
ECHO LA AREA 4C: 16.1 CM2
ECHO LA DIAMETER INDEX: 1.95 CM/M2
ECHO LA DIAMETER: 3.3 CM
ECHO LA MAJOR AXIS: 5.8 CM
ECHO LA MINOR AXIS: 4.7 CM
ECHO LA TO AORTIC ROOT RATIO: 1.22
ECHO LA VOL BP: 44 ML (ref 22–52)
ECHO LA VOL MOD A2C: 45 ML (ref 22–52)
ECHO LA VOL MOD A4C: 36 ML (ref 22–52)
ECHO LA VOL/BSA BIPLANE: 26 ML/M2 (ref 16–34)
ECHO LA VOLUME INDEX MOD A2C: 27 ML/M2 (ref 16–34)
ECHO LA VOLUME INDEX MOD A4C: 21 ML/M2 (ref 16–34)
ECHO LV E' LATERAL VELOCITY: 8 CM/S
ECHO LV E' SEPTAL VELOCITY: 6 CM/S
ECHO LV EDV A2C: 68 ML
ECHO LV EDV A4C: 91 ML
ECHO LV EDV INDEX A4C: 54 ML/M2
ECHO LV EDV NDEX A2C: 40 ML/M2
ECHO LV EJECTION FRACTION A2C: 58 %
ECHO LV EJECTION FRACTION A4C: 68 %
ECHO LV EJECTION FRACTION BIPLANE: 63 % (ref 55–100)
ECHO LV ESV A2C: 29 ML
ECHO LV ESV A4C: 29 ML
ECHO LV ESV INDEX A2C: 17 ML/M2
ECHO LV ESV INDEX A4C: 17 ML/M2
ECHO LV FRACTIONAL SHORTENING: 38 % (ref 28–44)
ECHO LV INTERNAL DIMENSION DIASTOLE INDEX: 2.66 CM/M2
ECHO LV INTERNAL DIMENSION DIASTOLIC: 4.5 CM (ref 3.9–5.3)
ECHO LV INTERNAL DIMENSION SYSTOLIC INDEX: 1.66 CM/M2
ECHO LV INTERNAL DIMENSION SYSTOLIC: 2.8 CM
ECHO LV IVSD: 0.9 CM (ref 0.6–0.9)
ECHO LV MASS 2D: 153.3 G (ref 67–162)
ECHO LV MASS INDEX 2D: 90.7 G/M2 (ref 43–95)
ECHO LV POSTERIOR WALL DIASTOLIC: 1.1 CM (ref 0.6–0.9)
ECHO LV RELATIVE WALL THICKNESS RATIO: 0.49
ECHO LVOT AREA: 1.3 CM2
ECHO LVOT AV VTI INDEX: 0.68
ECHO LVOT DIAM: 1.3 CM
ECHO LVOT MEAN GRADIENT: 2 MMHG
ECHO LVOT PEAK GRADIENT: 4 MMHG
ECHO LVOT PEAK VELOCITY: 1 M/S
ECHO LVOT STROKE VOLUME INDEX: 19.7 ML/M2
ECHO LVOT SV: 33.3 ML
ECHO LVOT VTI: 25.1 CM
ECHO MV A VELOCITY: 1.04 M/S
ECHO MV AREA VTI: 0.8 CM2
ECHO MV E DECELERATION TIME (DT): 268 MS
ECHO MV E VELOCITY: 1.12 M/S
ECHO MV E/A RATIO: 1.08
ECHO MV E/E' LATERAL: 14
ECHO MV E/E' RATIO (AVERAGED): 16.33
ECHO MV E/E' SEPTAL: 18.67
ECHO MV LVOT VTI INDEX: 1.68
ECHO MV MAX VELOCITY: 1.2 M/S
ECHO MV MEAN GRADIENT: 2 MMHG
ECHO MV MEAN VELOCITY: 0.7 M/S
ECHO MV PEAK GRADIENT: 6 MMHG
ECHO MV REGURGITANT PEAK GRADIENT: 55 MMHG
ECHO MV REGURGITANT PEAK VELOCITY: 3.7 M/S
ECHO MV VTI: 42.2 CM
ECHO PV MAX VELOCITY: 0.9 M/S
ECHO PV PEAK GRADIENT: 3 MMHG
ECHO RA AREA 4C: 9.7 CM2
ECHO RA END SYSTOLIC VOLUME APICAL 4 CHAMBER INDEX BSA: 9 ML/M2
ECHO RA VOLUME: 16 ML
ECHO RIGHT VENTRICULAR SYSTOLIC PRESSURE (RVSP): 19 MMHG
ECHO RV BASAL DIMENSION: 3.7 CM
ECHO RV FREE WALL PEAK S': 10 CM/S
ECHO RV LONGITUDINAL DIMENSION: 5.6 CM
ECHO RV MID DIMENSION: 2.7 CM
ECHO RV TAPSE: 2.4 CM (ref 1.7–?)
ECHO TV REGURGITANT MAX VELOCITY: 1.99 M/S
ECHO TV REGURGITANT PEAK GRADIENT: 16 MMHG
EKG ATRIAL RATE: 58 BPM
EKG DIAGNOSIS: NORMAL
EKG P AXIS: 36 DEGREES
EKG P-R INTERVAL: 136 MS
EKG Q-T INTERVAL: 460 MS
EKG QRS DURATION: 88 MS
EKG QTC CALCULATION (BAZETT): 451 MS
EKG R AXIS: 17 DEGREES
EKG T AXIS: 15 DEGREES
EKG VENTRICULAR RATE: 58 BPM
ERYTHROCYTE [SEDIMENTATION RATE] IN BLOOD BY WESTERGREN METHOD: 15 MM/HR (ref 0–30)
FERRITIN SERPL IA-MCNC: 131 NG/ML (ref 15–150)
GFR SERPLBLD CREATININE-BSD FMLA CKD-EPI: >90 ML/MIN/{1.73_M2}
GLUCOSE SERPL-MCNC: 85 MG/DL (ref 70–99)
HCT VFR BLD AUTO: 32.2 % (ref 36–48)
HDLC SERPL-MCNC: 40 MG/DL (ref 40–60)
HGB BLD-MCNC: 11.1 G/DL (ref 12–16)
IRON SATN MFR SERPL: 32 % (ref 15–50)
IRON SERPL-MCNC: 60 UG/DL (ref 37–145)
LDLC SERPL CALC-MCNC: 79 MG/DL
MCH RBC QN AUTO: 30.3 PG (ref 26–34)
MCHC RBC AUTO-ENTMCNC: 34.3 G/DL (ref 31–36)
MCV RBC AUTO: 88.3 FL (ref 80–100)
PLATELET # BLD AUTO: 323 K/UL (ref 135–450)
PMV BLD AUTO: 7.2 FL (ref 5–10.5)
POTASSIUM SERPL-SCNC: 4.5 MMOL/L (ref 3.5–5.1)
RBC # BLD AUTO: 3.65 M/UL (ref 4–5.2)
SODIUM SERPL-SCNC: 138 MMOL/L (ref 136–145)
TIBC SERPL-MCNC: 190 UG/DL (ref 260–445)
TRIGL SERPL-MCNC: 41 MG/DL (ref 0–150)
TSH SERPL DL<=0.005 MIU/L-ACNC: 0.59 UIU/ML (ref 0.27–4.2)
VLDLC SERPL CALC-MCNC: 8 MG/DL
WBC # BLD AUTO: 5.6 K/UL (ref 4–11)

## 2024-05-16 PROCEDURE — 6370000000 HC RX 637 (ALT 250 FOR IP): Performed by: PHYSICIAN ASSISTANT

## 2024-05-16 PROCEDURE — 80061 LIPID PANEL: CPT

## 2024-05-16 PROCEDURE — 82728 ASSAY OF FERRITIN: CPT

## 2024-05-16 PROCEDURE — 36415 COLL VENOUS BLD VENIPUNCTURE: CPT

## 2024-05-16 PROCEDURE — 80048 BASIC METABOLIC PNL TOTAL CA: CPT

## 2024-05-16 PROCEDURE — 94640 AIRWAY INHALATION TREATMENT: CPT

## 2024-05-16 PROCEDURE — 84443 ASSAY THYROID STIM HORMONE: CPT

## 2024-05-16 PROCEDURE — 85027 COMPLETE CBC AUTOMATED: CPT

## 2024-05-16 PROCEDURE — 93306 TTE W/DOPPLER COMPLETE: CPT | Performed by: INTERNAL MEDICINE

## 2024-05-16 PROCEDURE — G0378 HOSPITAL OBSERVATION PER HR: HCPCS

## 2024-05-16 PROCEDURE — 96375 TX/PRO/DX INJ NEW DRUG ADDON: CPT

## 2024-05-16 PROCEDURE — 99223 1ST HOSP IP/OBS HIGH 75: CPT | Performed by: INTERNAL MEDICINE

## 2024-05-16 PROCEDURE — 93010 ELECTROCARDIOGRAM REPORT: CPT | Performed by: INTERNAL MEDICINE

## 2024-05-16 PROCEDURE — 83540 ASSAY OF IRON: CPT

## 2024-05-16 PROCEDURE — 6360000002 HC RX W HCPCS: Performed by: STUDENT IN AN ORGANIZED HEALTH CARE EDUCATION/TRAINING PROGRAM

## 2024-05-16 PROCEDURE — 85652 RBC SED RATE AUTOMATED: CPT

## 2024-05-16 PROCEDURE — 94761 N-INVAS EAR/PLS OXIMETRY MLT: CPT

## 2024-05-16 PROCEDURE — 93306 TTE W/DOPPLER COMPLETE: CPT

## 2024-05-16 PROCEDURE — 6370000000 HC RX 637 (ALT 250 FOR IP): Performed by: INTERNAL MEDICINE

## 2024-05-16 PROCEDURE — 83550 IRON BINDING TEST: CPT

## 2024-05-16 PROCEDURE — 86140 C-REACTIVE PROTEIN: CPT

## 2024-05-16 PROCEDURE — 2580000003 HC RX 258: Performed by: PHYSICIAN ASSISTANT

## 2024-05-16 RX ORDER — RISPERIDONE 1 MG/1
1 TABLET ORAL EVERY MORNING
Status: DISCONTINUED | OUTPATIENT
Start: 2024-05-16 | End: 2024-05-18 | Stop reason: HOSPADM

## 2024-05-16 RX ORDER — ONDANSETRON 2 MG/ML
4 INJECTION INTRAMUSCULAR; INTRAVENOUS EVERY 6 HOURS PRN
Status: DISCONTINUED | OUTPATIENT
Start: 2024-05-16 | End: 2024-05-18 | Stop reason: HOSPADM

## 2024-05-16 RX ORDER — SENNOSIDES A AND B 8.6 MG/1
1 TABLET, FILM COATED ORAL DAILY PRN
Status: DISCONTINUED | OUTPATIENT
Start: 2024-05-16 | End: 2024-05-18 | Stop reason: HOSPADM

## 2024-05-16 RX ORDER — DULOXETIN HYDROCHLORIDE 60 MG/1
60 CAPSULE, DELAYED RELEASE ORAL 2 TIMES DAILY
Status: DISCONTINUED | OUTPATIENT
Start: 2024-05-16 | End: 2024-05-18 | Stop reason: HOSPADM

## 2024-05-16 RX ORDER — SODIUM CHLORIDE 0.9 % (FLUSH) 0.9 %
10 SYRINGE (ML) INJECTION PRN
Status: DISCONTINUED | OUTPATIENT
Start: 2024-05-16 | End: 2024-05-18 | Stop reason: HOSPADM

## 2024-05-16 RX ORDER — ENOXAPARIN SODIUM 100 MG/ML
40 INJECTION SUBCUTANEOUS DAILY
Status: DISCONTINUED | OUTPATIENT
Start: 2024-05-16 | End: 2024-05-18 | Stop reason: HOSPADM

## 2024-05-16 RX ORDER — KETOROLAC TROMETHAMINE 30 MG/ML
30 INJECTION, SOLUTION INTRAMUSCULAR; INTRAVENOUS EVERY 6 HOURS PRN
Status: DISCONTINUED | OUTPATIENT
Start: 2024-05-16 | End: 2024-05-18 | Stop reason: HOSPADM

## 2024-05-16 RX ORDER — ACETAMINOPHEN 650 MG/1
650 SUPPOSITORY RECTAL EVERY 6 HOURS PRN
Status: DISCONTINUED | OUTPATIENT
Start: 2024-05-16 | End: 2024-05-18 | Stop reason: HOSPADM

## 2024-05-16 RX ORDER — DOCUSATE SODIUM 100 MG/1
100 CAPSULE, LIQUID FILLED ORAL DAILY
Status: DISCONTINUED | OUTPATIENT
Start: 2024-05-16 | End: 2024-05-18 | Stop reason: HOSPADM

## 2024-05-16 RX ORDER — SODIUM CHLORIDE 9 MG/ML
INJECTION, SOLUTION INTRAVENOUS PRN
Status: DISCONTINUED | OUTPATIENT
Start: 2024-05-16 | End: 2024-05-18 | Stop reason: HOSPADM

## 2024-05-16 RX ORDER — NITROGLYCERIN 0.4 MG/1
0.4 TABLET SUBLINGUAL EVERY 5 MIN PRN
Status: DISCONTINUED | OUTPATIENT
Start: 2024-05-16 | End: 2024-05-18 | Stop reason: HOSPADM

## 2024-05-16 RX ORDER — PANTOPRAZOLE SODIUM 40 MG/1
40 TABLET, DELAYED RELEASE ORAL
Status: DISCONTINUED | OUTPATIENT
Start: 2024-05-16 | End: 2024-05-18 | Stop reason: HOSPADM

## 2024-05-16 RX ORDER — ASPIRIN 81 MG/1
81 TABLET, CHEWABLE ORAL NIGHTLY
Status: DISCONTINUED | OUTPATIENT
Start: 2024-05-16 | End: 2024-05-18 | Stop reason: HOSPADM

## 2024-05-16 RX ORDER — MIRTAZAPINE 15 MG/1
15 TABLET, FILM COATED ORAL NIGHTLY
Status: DISCONTINUED | OUTPATIENT
Start: 2024-05-16 | End: 2024-05-18 | Stop reason: HOSPADM

## 2024-05-16 RX ORDER — LOSARTAN POTASSIUM 100 MG/1
100 TABLET ORAL DAILY
Status: DISCONTINUED | OUTPATIENT
Start: 2024-05-16 | End: 2024-05-18 | Stop reason: HOSPADM

## 2024-05-16 RX ORDER — CETIRIZINE HYDROCHLORIDE 10 MG/1
10 TABLET ORAL DAILY
Status: DISCONTINUED | OUTPATIENT
Start: 2024-05-16 | End: 2024-05-18 | Stop reason: HOSPADM

## 2024-05-16 RX ORDER — ALBUTEROL SULFATE 90 UG/1
2 AEROSOL, METERED RESPIRATORY (INHALATION) EVERY 6 HOURS PRN
Status: DISCONTINUED | OUTPATIENT
Start: 2024-05-16 | End: 2024-05-18 | Stop reason: HOSPADM

## 2024-05-16 RX ORDER — RISPERIDONE 1 MG/1
2 TABLET ORAL NIGHTLY
Status: DISCONTINUED | OUTPATIENT
Start: 2024-05-16 | End: 2024-05-18 | Stop reason: HOSPADM

## 2024-05-16 RX ORDER — SENNOSIDES A AND B 8.6 MG/1
8.6 TABLET, FILM COATED ORAL DAILY
Status: DISCONTINUED | OUTPATIENT
Start: 2024-05-16 | End: 2024-05-18 | Stop reason: HOSPADM

## 2024-05-16 RX ORDER — ACETAMINOPHEN 325 MG/1
650 TABLET ORAL EVERY 6 HOURS PRN
Status: DISCONTINUED | OUTPATIENT
Start: 2024-05-16 | End: 2024-05-18 | Stop reason: HOSPADM

## 2024-05-16 RX ORDER — ZOLPIDEM TARTRATE 10 MG/1
10 TABLET ORAL NIGHTLY
Status: DISCONTINUED | OUTPATIENT
Start: 2024-05-16 | End: 2024-05-18 | Stop reason: HOSPADM

## 2024-05-16 RX ORDER — ATORVASTATIN CALCIUM 40 MG/1
40 TABLET, FILM COATED ORAL NIGHTLY
Status: DISCONTINUED | OUTPATIENT
Start: 2024-05-16 | End: 2024-05-18 | Stop reason: HOSPADM

## 2024-05-16 RX ORDER — OXYBUTYNIN CHLORIDE 5 MG/1
10 TABLET, EXTENDED RELEASE ORAL NIGHTLY
Status: DISCONTINUED | OUTPATIENT
Start: 2024-05-16 | End: 2024-05-18 | Stop reason: HOSPADM

## 2024-05-16 RX ORDER — SODIUM CHLORIDE 0.9 % (FLUSH) 0.9 %
10 SYRINGE (ML) INJECTION EVERY 12 HOURS SCHEDULED
Status: DISCONTINUED | OUTPATIENT
Start: 2024-05-16 | End: 2024-05-18 | Stop reason: HOSPADM

## 2024-05-16 RX ADMIN — DULOXETINE HYDROCHLORIDE 60 MG: 60 CAPSULE, DELAYED RELEASE ORAL at 07:41

## 2024-05-16 RX ADMIN — KETOROLAC TROMETHAMINE 30 MG: 30 INJECTION, SOLUTION INTRAMUSCULAR at 11:16

## 2024-05-16 RX ADMIN — TIOTROPIUM BROMIDE INHALATION SPRAY 2 PUFF: 3.12 SPRAY, METERED RESPIRATORY (INHALATION) at 09:39

## 2024-05-16 RX ADMIN — MIRTAZAPINE 15 MG: 15 TABLET, FILM COATED ORAL at 20:46

## 2024-05-16 RX ADMIN — ACETAMINOPHEN 650 MG: 325 TABLET ORAL at 02:01

## 2024-05-16 RX ADMIN — CETIRIZINE HYDROCHLORIDE 10 MG: 10 TABLET, FILM COATED ORAL at 07:40

## 2024-05-16 RX ADMIN — RISPERIDONE 2 MG: 1 TABLET, FILM COATED ORAL at 20:39

## 2024-05-16 RX ADMIN — ASPIRIN 81 MG: 81 TABLET, CHEWABLE ORAL at 20:39

## 2024-05-16 RX ADMIN — Medication 2 PUFF: at 20:42

## 2024-05-16 RX ADMIN — RISPERIDONE 1 MG: 1 TABLET, FILM COATED ORAL at 07:40

## 2024-05-16 RX ADMIN — ATORVASTATIN CALCIUM 40 MG: 40 TABLET, FILM COATED ORAL at 20:39

## 2024-05-16 RX ADMIN — PANTOPRAZOLE SODIUM 40 MG: 40 TABLET, DELAYED RELEASE ORAL at 06:15

## 2024-05-16 RX ADMIN — ACETAMINOPHEN 650 MG: 325 TABLET ORAL at 08:45

## 2024-05-16 RX ADMIN — Medication 10 ML: at 20:39

## 2024-05-16 RX ADMIN — LOSARTAN POTASSIUM 100 MG: 100 TABLET, FILM COATED ORAL at 07:41

## 2024-05-16 RX ADMIN — LIDOCAINE HYDROCHLORIDE: 20 SOLUTION ORAL at 08:45

## 2024-05-16 RX ADMIN — SENNOSIDES 8.6 MG: 8.6 TABLET, FILM COATED ORAL at 07:40

## 2024-05-16 RX ADMIN — DULOXETINE HYDROCHLORIDE 60 MG: 60 CAPSULE, DELAYED RELEASE ORAL at 20:39

## 2024-05-16 RX ADMIN — DOCUSATE SODIUM 100 MG: 100 CAPSULE, LIQUID FILLED ORAL at 07:40

## 2024-05-16 RX ADMIN — Medication 10 ML: at 07:42

## 2024-05-16 RX ADMIN — ZOLPIDEM TARTRATE 10 MG: 10 TABLET ORAL at 20:39

## 2024-05-16 RX ADMIN — OXYBUTYNIN CHLORIDE 10 MG: 5 TABLET, EXTENDED RELEASE ORAL at 20:39

## 2024-05-16 RX ADMIN — Medication 2 PUFF: at 09:39

## 2024-05-16 ASSESSMENT — PAIN SCALES - GENERAL
PAINLEVEL_OUTOF10: 8
PAINLEVEL_OUTOF10: 0
PAINLEVEL_OUTOF10: 0
PAINLEVEL_OUTOF10: 8
PAINLEVEL_OUTOF10: 10
PAINLEVEL_OUTOF10: 10
PAINLEVEL_OUTOF10: 0
PAINLEVEL_OUTOF10: 4
PAINLEVEL_OUTOF10: 0
PAINLEVEL_OUTOF10: 5
PAINLEVEL_OUTOF10: 10

## 2024-05-16 ASSESSMENT — PAIN DESCRIPTION - ORIENTATION
ORIENTATION: MID

## 2024-05-16 ASSESSMENT — PAIN DESCRIPTION - DESCRIPTORS
DESCRIPTORS: ACHING
DESCRIPTORS: SHARP
DESCRIPTORS: THROBBING
DESCRIPTORS: ACHING
DESCRIPTORS: ACHING

## 2024-05-16 ASSESSMENT — PAIN DESCRIPTION - LOCATION
LOCATION: HEAD
LOCATION: HEAD
LOCATION: CHEST;HEAD
LOCATION: CHEST;HEAD
LOCATION: CHEST
LOCATION: HEAD

## 2024-05-16 ASSESSMENT — PAIN SCALES - WONG BAKER

## 2024-05-16 ASSESSMENT — PAIN DESCRIPTION - FREQUENCY: FREQUENCY: CONTINUOUS

## 2024-05-16 ASSESSMENT — PAIN - FUNCTIONAL ASSESSMENT: PAIN_FUNCTIONAL_ASSESSMENT: ACTIVITIES ARE NOT PREVENTED

## 2024-05-16 ASSESSMENT — PAIN DESCRIPTION - PAIN TYPE: TYPE: ACUTE PAIN

## 2024-05-16 NOTE — PROGRESS NOTES
Date of Admission: 5/15/2024. Hospital Day: 2       Assessment/Plan:    Active Hospital Problems    Diagnosis     Chest pain [R07.9]       58 y.o. female who presented to ED via EMS from [facility] for evaluation of chest pain.  Patient is a poor historian.  History supplemented by chart review.  Patient was seen in ED for chest pain on 5/13.     Chest pain  - EKG shows sinus bradycardia, rate 58, nonspecific T wave abnormality  - Troponins  wnl x  - ASA, statin  - Check lipid panel   - Monitor on telemetry   - Cardiology consulted  -echo ordered  to r/p pericardial disease  . Could also be MSK d/t chest wall tenderness  -prn toradol     HTN  - Continue home Losartan     Morbid obesity due to excess calories (Body mass index is 43.88 kg/m².)   - Complicating assessment and treatment.   - Obesity places the patient at risk for multiple co-morbidities as well as early death and may be contributing to the patient's presentation.   - Supportive care.  - Encourage therapeutic lifestyle changes.      Asthma without exacerbation  - Continue home regimen     Psychotic disorder  - Continue home regimen    DVT Prophylaxis:    Diet: ADULT DIET; Regular  Code Status: Full Code      Dispo - home    All  follow up labs and imaging personally reviewed      Chief Complaint:   Chief Complaint   Patient presents with    Chest Pain    Headache     Pt to ED from [facility] via [place] ems with c/o chest pain and headache since Monday. Pt was seen here on Monday for the same thing.          Interval  History:   Appear comfortable. Report 'punching like pain central chest, tenderness lt chest wall , constant.        Medications:  Reviewed    Infusion Medications    sodium chloride       Scheduled Medications    aspirin  81 mg Oral Nightly    docusate sodium  100 mg Oral Daily    DULoxetine  60 mg Oral BID    cetirizine  10 mg Oral Daily    losartan  100 mg Oral Daily    mirtazapine  15 mg Oral Nightly    pantoprazole  40 mg  Oral QAM AC    oxyBUTYnin  10 mg Oral Nightly    risperiDONE  1 mg Oral QAM    risperiDONE  2 mg Oral Nightly    senna  8.6 mg Oral Daily    zolpidem  10 mg Oral Nightly    sodium chloride flush  10 mL IntraVENous 2 times per day    atorvastatin  40 mg Oral Nightly    enoxaparin  40 mg SubCUTAneous Daily    mometasone-formoterol  2 puff Inhalation BID RT    tiotropium  2 puff Inhalation Daily RT     PRN Meds: albuterol sulfate HFA, sodium chloride flush, sodium chloride, ondansetron, acetaminophen **OR** acetaminophen, senna, nitroGLYCERIN, perflutren lipid microspheres, ketorolac    No intake or output data in the 24 hours ending 05/16/24 1413    Physical Exam Performed:    /62   Pulse 58   Temp 97.6 °F (36.4 °C) (Temporal)   Resp 16   Ht 1.372 m (4' 6\")   Wt 86.2 kg (190 lb)   SpO2 98%   BMI 45.81 kg/m²     General appearance: No apparent distress, appears stated age and cooperative.  Respiratory:  Normal respiratory effort. Clear to auscultation, bilaterally without Rales/Wheezes/Rhonchi.  Cardiovascular: Regular rate and rhythm with normal S1/S2 without murmurs, rubs or gallops.  Abdomen: Soft, non-tender, non-distended with normal bowel sounds.  Musculoskeletal: No clubbing, cyanosis or edema bilaterally.  Full range of motion without deformity.  Skin: Skin color, texture, turgor normal.  No rashes or lesions.  Neurologic:  Neurovascularly intact without any focal sensory/motor deficits. Cranial nerves: II-XII intact, grossly non-focal.  Psychiatric: Alert and oriented, thought content appropriate, normal insight      Labs:   Recent Labs     05/14/24 0044 05/15/24  2021 05/16/24  0433   WBC 5.9 6.3 5.6   HGB 11.3* 12.5 11.1*   HCT 33.8* 36.0 32.2*    343 323     Recent Labs     05/14/24  0044 05/15/24  2021 05/16/24  0433    138 138   K 5.1 4.4 4.5    100 105   CO2 27 27 25   BUN 15 15 16   CREATININE 0.8 0.7 0.7   CALCIUM 9.5 10.1 9.3     Recent Labs     05/14/24  0044  05/15/24  2021   AST 41* 28   ALT 45* 38   BILITOT <0.2 0.3   ALKPHOS 103 117     No results for input(s): \"INR\" in the last 72 hours.  Recent Labs     05/14/24  0142 05/15/24  2021 05/15/24  2158   TROPHS 11 9 8       Urinalysis:      Lab Results   Component Value Date/Time    NITRU Negative 03/05/2024 08:21 PM    WBCUA 16 03/05/2024 08:21 PM    BACTERIA None Seen 03/05/2024 08:21 PM    RBCUA 0 03/05/2024 08:21 PM    BLOODU Negative 03/05/2024 08:21 PM    GLUCOSEU Negative 03/05/2024 08:21 PM       Radiology:  CT HEAD WO CONTRAST   Final Result   Minimal cortical atrophy and mild chronic microischemic changes scattered in   the deep white matter which is unchanged with no acute abnormality seen.      Moderate cerebellar atrophy which is unchanged.      Mild chronic sinusitis.         XR CHEST PORTABLE   Final Result   Cardiomegaly with bibasilar subsegmental atelectasis.             IP CONSULT TO CARDIOLOGY      Nicholas Mccauley MD

## 2024-05-16 NOTE — ED NOTES
How does patient ambulate?   []Low Fall Risk (ambulates by themselves without support)  [x]Stand by assist   []Contact Guard   []Front wheel walker  []Wheelchair   []Steady  []Bed bound  []History of Lower Extremity Amputation  []Unknown, did not assess in the emergency department   How does patient take pills?  [x]Whole with Water  []Crushed in applesauce  []Crushed in pudding  []Other  []Unknown no oral medications were given in the ED  Is patient alert?   [x]Alert  []Drowsy but responds to voice  []Doesn't respond to voice but responds to painful stimuli  []Unresponsive  Is patient oriented?   [x]To person  [x]To place  [x]To time  [x]To situation  []Confused  []Agitated  []Follows commands  If patient is disoriented or from a Skill Nursing Facility has family been notified of admission?   [x]Yes   []No  Patient belongings?   []Cell phone  []Wallet   []Dentures  [x]Clothing  Any specific patient or family belongings/needs/dynamics?   [place]  Miscellaneous comments/pending orders?  N/A     If there are any additional questions please reach out to the Emergency Department.

## 2024-05-16 NOTE — PLAN OF CARE
Problem: Discharge Planning  Goal: Discharge to home or other facility with appropriate resources  Outcome: Progressing  Flowsheets (Taken 5/16/2024 0016)  Discharge to home or other facility with appropriate resources: Identify barriers to discharge with patient and caregiver     Problem: Pain  Goal: Verbalizes/displays adequate comfort level or baseline comfort level  Outcome: Progressing     Problem: Safety - Adult  Goal: Free from fall injury  Outcome: Progressing

## 2024-05-16 NOTE — H&P
Hospital Medicine History & Physical        Name:  Aster Jauregui /Age/Sex: 1965  (58 y.o. female)   MRN & CSN:  [id number] & [id number] Encounter Date/Time: 5/15/2024 11:08 PM EDT   Location:  - PCP: No primary care provider on file.         CHIEF COMPLAINT:   Chief Complaint   Patient presents with    Chest Pain    Headache     Pt to ED from [facility] via [place] [facility] with c/o chest pain and headache since Monday. Pt was seen here on Monday for the same thing.          HISTORY OF PRESENT ILLNESS:      History from: patient  Limitations to history : Intellectual disability     Aster Jauregui is a 58 y.o. female who presented to ED via EMS from [facility] for evaluation of chest pain.  Patient is a poor historian.  History supplemented by chart review.  Patient was seen in ED for chest pain on .  Workup was unremarkable and patient was discharged home.  Patient reports she has had constant substernal chest pain since she was discharged.  She reports associated shortness of breath and headache.  She denies any known aggravating or alleviating factors.  She denies fever, dizziness, lightheadedness, cough, abdominal pain, nausea, vomiting, diarrhea, constipation, urinary symptoms. She denies any other complaints or concerns at this time.      REVIEW OF SYSTEMS:   Pertinent positives as noted in the HPI. All other systems reviewed and negative.      PHYSICAL EXAM PERFORMED:  /62   Pulse 57   Temp 97.6 °F (36.4 °C) (Oral)   Resp 17   Ht 1.372 m (4' 6\")   Wt 82.6 kg (182 lb)   SpO2 98%   BMI 43.88 kg/m²     General appearance:  Awake, alert, no apparent distress  HEENT:  Normocephalic, atraumatic without obvious deformity. PERRL. EOM intact. Conjunctivae/corneas clear.  Neck: Supple, with full range of motion. No JVD. Trachea midline.  Respiratory:  Clear to auscultation bilaterally without rales, wheezes, or rhonchi. Normal respiratory effort.  MD Kalie   furosemide (LASIX) 20 MG tablet Take 0.5 tablets by mouth every 48 hours    Kalie Weaver MD   guaiFENesin (ROBITUSSIN) 100 MG/5ML liquid Take 10 mLs by mouth every 4 hours as needed    Kalie Weaver MD   loratadine (CLARITIN) 10 MG tablet Take 1 tablet by mouth every morning    Kalie Weaver MD   loperamide (IMODIUM) 2 MG capsule Take 2 capsules by mouth as needed    Kalie Weaver MD   risperiDONE (RISPERDAL) 1 MG tablet Take 1 tablet by mouth every morning 10/3/22   Kalie Weaver MD   risperiDONE (RISPERDAL) 2 MG tablet Take 1 tablet by mouth nightly 10/3/22   Kalie Weaver MD   Sennosides 8.6 MG CAPS Take 8.6 mg by mouth daily 9/2/23   Kalie Weaver MD   zolpidem (AMBIEN) 10 MG tablet Take 1 tablet by mouth nightly. 5/18/23   Kalie Weaver MD       PAST MEDICAL HISTORY  PMHx   Past Medical History:   Diagnosis Date    Allergic rhinitis     Alopecia     Anterolisthesis of lumbar spine     Anxiety     Asthma     Constipation     Depression     GERD (gastroesophageal reflux disease)     Hyperopia     Hypertension     Insomnia     Mild intellectual disability     Obstructive sleep apnea     Osteoarthritis of both knees     Osteopenia     Ovarian cyst     Overactive bladder     Periumbilical hernia     Presbyopia     Psychotic disorder (HCC)      PSHX: History reviewed. No pertinent surgical history.  Allergies: No Known Allergies  Fam HX:  History reviewed. No pertinent family history.  Soc HX:   Social History     Socioeconomic History    Marital status: Unknown     Spouse name: None    Number of children: None    Years of education: None    Highest education level: None   Tobacco Use    Smoking status: Never     Passive exposure: Never    Smokeless tobacco: Never   Substance and Sexual Activity    Alcohol use: Never    Drug use: Never    Sexual activity: Never       MEDICATIONS  Medications:    aspirin  81 mg Oral Nightly    Docusate  Sodium  100 mg Oral Daily    DULoxetine  60 mg Oral BID    fluticasone-umeclidin-vilant  1 puff Inhalation Daily    cetirizine  10 mg Oral Daily    losartan  100 mg Oral Daily    mirtazapine  15 mg Oral Nightly    pantoprazole  40 mg Oral QAM AC    oxyBUTYnin  10 mg Oral Nightly    risperiDONE  1 mg Oral QAM    risperiDONE  2 mg Oral Nightly    Sennosides  8.6 mg Oral Daily    zolpidem  10 mg Oral Nightly    sodium chloride flush  10 mL IntraVENous 2 times per day    atorvastatin  40 mg Oral Nightly    enoxaparin  40 mg SubCUTAneous Daily      Infusions:    sodium chloride       PRN Meds: albuterol sulfate HFA, 2 puff, Q6H PRN  sodium chloride flush, 10 mL, PRN  sodium chloride, , PRN  ondansetron, 4 mg, Q6H PRN  acetaminophen, 650 mg, Q6H PRN   Or  acetaminophen, 650 mg, Q6H PRN  senna, 1 tablet, Daily PRN  nitroGLYCERIN, 0.4 mg, Q5 Min PRN        LABS  CBC:   Recent Labs     05/14/24  0044 05/15/24  2021   WBC 5.9 6.3   HGB 11.3* 12.5    343     BMP:    Recent Labs     05/14/24  0044 05/15/24  2021    138   K 5.1 4.4    100   CO2 27 27   BUN 15 15   CREATININE 0.8 0.7   GLUCOSE 93 100*     Hepatic:   Recent Labs     05/14/24  0044 05/15/24  2021   AST 41* 28   ALT 45* 38   BILITOT <0.2 0.3   ALKPHOS 103 117     Lipids: No results found for: \"CHOL\", \"HDL\", \"TRIG\"  Hemoglobin A1C: No results found for: \"LABA1C\"  TSH: No results found for: \"TSH\"  Troponin: No results found for: \"TROPONINT\"  Lactic Acid: No results for input(s): \"LACTA\" in the last 72 hours.  BNP:   Recent Labs     05/14/24  0044 05/15/24  2021   PROBNP 41 57     UA:  Lab Results   Component Value Date/Time    NITRU Negative 03/05/2024 08:21 PM    COLORU Yellow 03/05/2024 08:21 PM    PHUR 7.0 03/05/2024 08:21 PM    WBCUA 16 03/05/2024 08:21 PM    RBCUA 0 03/05/2024 08:21 PM    BACTERIA None Seen 03/05/2024 08:21 PM    CLARITYU Clear 03/05/2024 08:21 PM    LEUKOCYTESUR MODERATE 03/05/2024 08:21 PM    UROBILINOGEN 0.2 03/05/2024 08:21

## 2024-05-16 NOTE — ED NOTES
Report called to Krysta TOTH. All questions and concerns answered at this time. Pt resting in bed. Respirations even and unlabored on room air.

## 2024-05-16 NOTE — PROGRESS NOTES
05/16/24 0144   RT Protocol   History Pulmonary Disease 0   Respiratory pattern 0   Breath sounds 0   Cough 0   Indications for Bronchodilator Therapy On home bronchodilators   Bronchodilator Assessment Score 0

## 2024-05-16 NOTE — ED PROVIDER NOTES
This patient was seen by the Mid-Level Provider. I have seen and examined the patient, agree with the workup, evaluation, management and diagnosis. Care plan has been discussed. My assessment reveals a 58-year-old female who presents with chest pain.  This is a 58-year-old female with a history of cognitive deficits who presents from [facility] today with some chest pain and headache.  She describes the headache as mild.  She has had this since Monday.  The patient was also seen 2 days ago with chest pain here as well.  She denies any shortness of breath.  She denies any cough or sputum production.          Radiology results:    CT HEAD WO CONTRAST   Final Result   Minimal cortical atrophy and mild chronic microischemic changes scattered in   the deep white matter which is unchanged with no acute abnormality seen.      Moderate cerebellar atrophy which is unchanged.      Mild chronic sinusitis.         XR CHEST PORTABLE   Final Result   Cardiomegaly with bibasilar subsegmental atelectasis.               LABS:    Labs Reviewed   COMPREHENSIVE METABOLIC PANEL W/ REFLEX TO MG FOR LOW K - Abnormal; Notable for the following components:       Result Value    Glucose 100 (*)     Albumin/Globulin Ratio 1.0 (*)     All other components within normal limits   CBC WITH AUTO DIFFERENTIAL   TROPONIN   BRAIN NATRIURETIC PEPTIDE   LIPASE   TROPONIN           EKG:    Sinus bradycardia at a rate of 58 beats a minute with no acute ST elevations or depressions or pathologic Q waves.  Normal axis.    Exam:    Well-nourished female in no acute distress.  She was neurologic intact with no motor or sensory deficits throughout.  NIH score 0.  Chest was clear to auscultation bilaterally.  Heart was regular rate rhythm with no murmurs rubs gallops.      Medical decision making:    The patient has remained stable for her hospital course.  Her initial workup was unremarkable and normal.  A CT of the head was negative.  The headache is not  severe.  In addition, a cardiac workup was obtained to the patient that was normal as well.  However, the patient scores a 3 on her heart score and this is the second time she is been here in 2 days for the same complaint.  Given her repeat visit and cognitive deficits I feel it is prudent to admit her for further care and observation.  She is currently in stable condition.    Is this patient to be included in the SEP-1 Core Measure due to severe sepsis or septic shock?   No   Exclusion criteria - the patient is NOT to be included for SEP-1 Core Measure due to:  Infection is not suspected      FINAL IMPRESSION:    1. Chest discomfort           Campbell Bhatia MD  05/15/24 5154

## 2024-05-16 NOTE — ED PROVIDER NOTES
[facility] EMERGENCY DEPARTMENT  EMERGENCY DEPARTMENT ENCOUNTER        Pt Name: Aster Jauregui  MRN: [id number]  Birthdate 1965  Date of evaluation: 5/15/2024  Provider: LOLI Suazo  PCP: No primary care provider on file.  Note Started: 10:38 PM EDT 5/15/24       I have seen and evaluated this patient with my supervising physician Campbell Bhatia MD.      CHIEF COMPLAINT       Chief Complaint   Patient presents with    Chest Pain    Headache     Pt to ED from [facility] via [place] [facility] with c/o chest pain and headache since Monday. Pt was seen here on Monday for the same thing.        HISTORY OF PRESENT ILLNESS: 1 or more Elements     History From: Patient  Limitations to history : None    Aster Jauregui is a 58 y.o. female with documented history of asthma, depression, GERD, hypertension, intellectual disability, anxiety who presents ED with complaint of chest discomfort.  Reports was just recently seen here on Monday for chest discomfort.  Reports discharged home.  Patient reports constant substernal chest pain since discharge on Monday.  Denies any aggravating leaving factors.  States she does feel short of breath.  States associated headache.  Denies lightheadedness or dizziness.  Denies abdominal pain, nausea or vomiting.  Denies decreased oral intake.  Denies urinary symptoms or changes in bowel movements.  Denies any known history of heart disease.  Patient relatively poor historian.    Nursing Notes were all reviewed and agreed with or any disagreements were addressed in the HPI.    REVIEW OF SYSTEMS :      Review of Systems   Constitutional:  Negative for activity change, appetite change, chills, diaphoresis, fatigue and fever.   Respiratory:  Positive for chest tightness and shortness of breath. Negative for cough.    Cardiovascular:  Positive for chest pain. Negative for palpitations and leg swelling.   Gastrointestinal:  Negative for abdominal pain,

## 2024-05-16 NOTE — PROGRESS NOTES
Pt's emergency contact, Melanie, contacted per the pt's request and given updates on the patients current status.

## 2024-05-16 NOTE — ED NOTES
ED TO INPATIENT SBAR HANDOFF    Patient Name: Aster Jauregui   :  1965  58 y.o.   MRN:  [id number]  Preferred Name  [facility]  ED Room #:  ED-0005/05  Family/Caregiver Present no   Restraints no   Sitter no   Sepsis Risk Score Sepsis Risk Score: 0.39    Situation  Code Status: No Order No additional code details.    Allergies: Patient has no known allergies.  Weight: Patient Vitals for the past 96 hrs (Last 3 readings):   Weight   05/15/24 1954 82.6 kg (182 lb)     Arrived from: [facility]  Chief Complaint:   Chief Complaint   Patient presents with    Chest Pain    Headache     Pt to ED from [facility] via [place] [facility] with c/o chest pain and headache since Monday. Pt was seen here on Monday for the same thing.      Hospital Problem/Diagnosis:  Active Problems:    * No active hospital problems. *  Resolved Problems:    * No resolved hospital problems. *    Imaging:   CT HEAD WO CONTRAST   Final Result   Minimal cortical atrophy and mild chronic microischemic changes scattered in   the deep white matter which is unchanged with no acute abnormality seen.      Moderate cerebellar atrophy which is unchanged.      Mild chronic sinusitis.         XR CHEST PORTABLE   Final Result   Cardiomegaly with bibasilar subsegmental atelectasis.           Abnormal labs:   Abnormal Labs Reviewed   COMPREHENSIVE METABOLIC PANEL W/ REFLEX TO MG FOR LOW K - Abnormal; Notable for the following components:       Result Value    Glucose 100 (*)     Albumin/Globulin Ratio 1.0 (*)     All other components within normal limits     Critical values: no     Abnormal Assessment Findings:     Background  History:   Past Medical History:   Diagnosis Date    Allergic rhinitis     Alopecia     Anterolisthesis of lumbar spine     Anxiety     Asthma     Constipation     Depression     GERD (gastroesophageal reflux disease)     Hyperopia     Hypertension     Insomnia     Mild intellectual disability     Obstructive sleep apnea      Osteoarthritis of both knees     Osteopenia     Ovarian cyst     Overactive bladder     Periumbilical hernia     Presbyopia     Psychotic disorder (HCC)        Assessment    Vitals/MEWS: MEWS Score: 1  Level of Consciousness: Alert (0)   Vitals:    05/15/24 2000 05/15/24 2015 05/15/24 2030 05/15/24 2157   BP: (!) 121/58 112/64  124/62   Pulse: 59 58 61 57   Resp: 15 16 16 17   Temp:       TempSrc:       SpO2: 100% 100%  98%   Weight:       Height:         FiO2 (%):   O2 Flow Rate:      Cardiac Rhythm:    Pain Assessment:  [x] Verbal [] Oyrk Beatty Scale  Pain Scale: Pain Assessment  Pain Assessment: 0-10  Pain Level: 10  Patient's Stated Pain Goal: 0 - No pain  Pain Location: Chest, Head  Pain Descriptors: Aching  Last documented pain score (0-10 scale) Pain Level: 10  Last documented pain medication administered: 2022  Mental Status: oriented, alert, coherent, logical, and thought processes intact  Orientation Level:    NIH Score:    C-SSRS: Risk of Suicide: No Risk  Bedside swallow:    Elizabeth Coma Scale (GCS): Megan Coma Scale  Eye Opening: Spontaneous  Best Verbal Response: Oriented  Best Motor Response: Obeys commands  Megan Coma Scale Score: 15  Active LDA's:   Peripheral IV 05/15/24 Left;Proximal Forearm (Active)                 PO Status: Regular  Pertinent or High Risk Medications/Drips: no   If Yes, please provide details:   Pending Blood Product Administration: no       You may also review the ED PT Care Timeline found under the Summary Nursing Index tab.    Recommendation    Pending orders Inpatient orders  Plan for Discharge (if known):   Additional Comments: Pt able to use call light. Swallows pills whole. Standy by assist.   If any further questions, please call Sending RN at [phone]    Electronically signed by: Electronically signed by Snow Alvarez RN on 5/15/2024 at 10:47 PM

## 2024-05-16 NOTE — PLAN OF CARE
Problem: Discharge Planning  Goal: Discharge to home or other facility with appropriate resources  5/16/2024 0755 by Yumiko Merritt RN  Outcome: Progressing  5/16/2024 0354 by Krysta Massey RN  Outcome: Progressing  Flowsheets (Taken 5/16/2024 0016)  Discharge to home or other facility with appropriate resources: Identify barriers to discharge with patient and caregiver     Problem: Pain  Goal: Verbalizes/displays adequate comfort level or baseline comfort level  5/16/2024 0755 by Yumiko Merritt, RN  Outcome: Progressing  5/16/2024 0354 by Krysta Massey, RN  Outcome: Progressing     Problem: Safety - Adult  Goal: Free from fall injury  5/16/2024 0755 by Yumiko Merritt RN  Outcome: Progressing  5/16/2024 0354 by Krysta Massey, RN  Outcome: Progressing

## 2024-05-16 NOTE — CONSULTS
[facility]   CONSULTATION  [phone]      Chief Complaint   Patient presents with    Chest Pain    Headache     Pt to ED from [facility] via [place] [facility] with c/o chest pain and headache since Monday. Pt was seen here on Monday for the same thing.             History of Present Illness:  Aster Jauregui is a 58 y.o. patient who presented to the hospital with complaints of chest discomfort. I have been asked to provide consultation regarding further management and testing. The patient reports that she grew up in [place] and lived in [place] until January 9 of this year, when she moved into a group home with 8 other people. 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 She states that she exercises two times a week and feels good when she does this. She states that she does 100 squats and rides a bicycle. No associated chest pain or pressure. She reports that she came to the hospital after developing chest discomfort on Monday. IT has been constant since then. She reports that it is centrally located, nonradiation chest discomfort. No change with respiration, exercise, or food. She denies any chest trauma or car accidents. She tried some tylenol and tums, which did not change her symptoms. She states that she has never had this discomfort before. She has been taking diclofenac for generalized pain. No associated melena or dysphagia.       Past Medical History:   has a past medical history of Allergic rhinitis, Alopecia, Anterolisthesis of lumbar spine, Anxiety, Asthma, Constipation, Depression, GERD (gastroesophageal reflux disease), Hyperopia, Hypertension, Insomnia, Mild intellectual disability, Obstructive sleep apnea, Osteoarthritis of both knees, Osteopenia, Ovarian cyst, Overactive bladder, Periumbilical hernia, Presbyopia, and Psychotic disorder ([facility]).    Surgical History:   has no past surgical history on file.     Social History:   reports that she has never smoked.  cpap    7. History of bronchomalacia         I will address the patient's cardiac risk factors and adjusted pharmacologic treatment as needed. In addition, I have reinforced the need for patient directed risk factor modification.    Tobacco use was discussed with the patient and educated on the negative effects. I have asked the patient to not utilize these agents.    Thank you for allowing to us to participate in the care or Aster Jauregui. Further evaluation will be based upon the patient's clinical course and testing results.    All questions and concerns were addressed to the patient/family. Alternatives to my treatment were discussed. The note was completed using EMR. Every effort was made to ensure accuracy; however, inadvertent computerized transcription errors may be present.       All questions and concerns were addressed to the patient/family. Alternatives to my treatment were discussed. The note was completed using EMR. Every effort was made to ensure accuracy; however, inadvertent computerized transcription errors may be present.  ALEN MITCHELL DO, MD 5/16/2024 8:22 AM

## 2024-05-17 ENCOUNTER — HOSPITAL ENCOUNTER (OUTPATIENT)
Age: 59
Setting detail: OBSERVATION
DRG: 313 | End: 2024-05-17
Attending: STUDENT IN AN ORGANIZED HEALTH CARE EDUCATION/TRAINING PROGRAM
Payer: MEDICARE

## 2024-05-17 ENCOUNTER — HOSPITAL ENCOUNTER (OUTPATIENT)
Age: 59
Setting detail: OBSERVATION
DRG: 313 | End: 2024-05-17
Attending: INTERNAL MEDICINE
Payer: MEDICARE

## 2024-05-17 ENCOUNTER — APPOINTMENT (OUTPATIENT)
Age: 59
DRG: 313 | End: 2024-05-17
Attending: STUDENT IN AN ORGANIZED HEALTH CARE EDUCATION/TRAINING PROGRAM
Payer: MEDICARE

## 2024-05-17 VITALS — BODY MASS INDEX: 43.51 KG/M2 | WEIGHT: 188 LBS | HEIGHT: 55 IN

## 2024-05-17 LAB
ECHO BSA: 1.8 M2
NUC STRESS EJECTION FRACTION: 69 %
NUC STRESS LV EDV: 87 ML (ref 56–104)
NUC STRESS LV ESV: 27 ML (ref 19–49)
NUC STRESS LV MASS: 122 G
STRESS BASELINE DIAS BP: 51 MMHG
STRESS BASELINE HR: 53 BPM
STRESS BASELINE SYS BP: 114 MMHG
STRESS EXERCISE DUR MIN: 4 MIN
STRESS EXERCISE DUR SEC: 0 SEC
STRESS PEAK DIAS BP: 44 MMHG
STRESS PEAK SYS BP: 110 MMHG
STRESS PERCENT HR ACHIEVED: 57 %
STRESS POST PEAK HR: 93 BPM
STRESS RATE PRESSURE PRODUCT: NORMAL BPM*MMHG
STRESS TARGET HR: 162 BPM
TID: 1.02

## 2024-05-17 PROCEDURE — 94761 N-INVAS EAR/PLS OXIMETRY MLT: CPT

## 2024-05-17 PROCEDURE — 93018 CV STRESS TEST I&R ONLY: CPT | Performed by: INTERNAL MEDICINE

## 2024-05-17 PROCEDURE — 78452 HT MUSCLE IMAGE SPECT MULT: CPT

## 2024-05-17 PROCEDURE — 6360000002 HC RX W HCPCS: Performed by: INTERNAL MEDICINE

## 2024-05-17 PROCEDURE — 94640 AIRWAY INHALATION TREATMENT: CPT

## 2024-05-17 PROCEDURE — 1200000000 HC SEMI PRIVATE

## 2024-05-17 PROCEDURE — 3430000000 HC RX DIAGNOSTIC RADIOPHARMACEUTICAL: Performed by: STUDENT IN AN ORGANIZED HEALTH CARE EDUCATION/TRAINING PROGRAM

## 2024-05-17 PROCEDURE — 2580000003 HC RX 258: Performed by: PHYSICIAN ASSISTANT

## 2024-05-17 PROCEDURE — 93016 CV STRESS TEST SUPVJ ONLY: CPT | Performed by: INTERNAL MEDICINE

## 2024-05-17 PROCEDURE — 78452 HT MUSCLE IMAGE SPECT MULT: CPT | Performed by: INTERNAL MEDICINE

## 2024-05-17 PROCEDURE — 6370000000 HC RX 637 (ALT 250 FOR IP): Performed by: STUDENT IN AN ORGANIZED HEALTH CARE EDUCATION/TRAINING PROGRAM

## 2024-05-17 PROCEDURE — 6370000000 HC RX 637 (ALT 250 FOR IP): Performed by: PHYSICIAN ASSISTANT

## 2024-05-17 PROCEDURE — A9502 TC99M TETROFOSMIN: HCPCS | Performed by: STUDENT IN AN ORGANIZED HEALTH CARE EDUCATION/TRAINING PROGRAM

## 2024-05-17 PROCEDURE — 99231 SBSQ HOSP IP/OBS SF/LOW 25: CPT | Performed by: INTERNAL MEDICINE

## 2024-05-17 PROCEDURE — 96376 TX/PRO/DX INJ SAME DRUG ADON: CPT

## 2024-05-17 PROCEDURE — 93017 CV STRESS TEST TRACING ONLY: CPT

## 2024-05-17 PROCEDURE — 6360000002 HC RX W HCPCS: Performed by: STUDENT IN AN ORGANIZED HEALTH CARE EDUCATION/TRAINING PROGRAM

## 2024-05-17 RX ORDER — AMINOPHYLLINE 25 MG/ML
100 INJECTION, SOLUTION INTRAVENOUS
Status: COMPLETED | OUTPATIENT
Start: 2024-05-17 | End: 2024-05-17

## 2024-05-17 RX ORDER — REGADENOSON 0.08 MG/ML
0.4 INJECTION, SOLUTION INTRAVENOUS
Status: COMPLETED | OUTPATIENT
Start: 2024-05-17 | End: 2024-05-17

## 2024-05-17 RX ORDER — METHOCARBAMOL 750 MG/1
750 TABLET, FILM COATED ORAL 3 TIMES DAILY
Status: DISCONTINUED | OUTPATIENT
Start: 2024-05-17 | End: 2024-05-18 | Stop reason: HOSPADM

## 2024-05-17 RX ORDER — LIDOCAINE 4 G/G
1 PATCH TOPICAL DAILY
Status: DISCONTINUED | OUTPATIENT
Start: 2024-05-17 | End: 2024-05-18 | Stop reason: HOSPADM

## 2024-05-17 RX ORDER — REGADENOSON 0.08 MG/ML
0.4 INJECTION, SOLUTION INTRAVENOUS
Status: DISCONTINUED | OUTPATIENT
Start: 2024-05-17 | End: 2024-05-20 | Stop reason: HOSPADM

## 2024-05-17 RX ORDER — IBUPROFEN 600 MG/1
600 TABLET ORAL 3 TIMES DAILY PRN
Qty: 30 TABLET | Refills: 0 | Status: SHIPPED | OUTPATIENT
Start: 2024-05-17 | End: 2024-05-18 | Stop reason: HOSPADM

## 2024-05-17 RX ADMIN — METHOCARBAMOL 750 MG: 750 TABLET ORAL at 20:29

## 2024-05-17 RX ADMIN — RISPERIDONE 1 MG: 1 TABLET, FILM COATED ORAL at 08:19

## 2024-05-17 RX ADMIN — Medication 10 ML: at 20:29

## 2024-05-17 RX ADMIN — SENNOSIDES 8.6 MG: 8.6 TABLET, FILM COATED ORAL at 08:19

## 2024-05-17 RX ADMIN — ASPIRIN 81 MG: 81 TABLET, CHEWABLE ORAL at 20:29

## 2024-05-17 RX ADMIN — LOSARTAN POTASSIUM 100 MG: 100 TABLET, FILM COATED ORAL at 08:19

## 2024-05-17 RX ADMIN — REGADENOSON 0.4 MG: 0.08 INJECTION, SOLUTION INTRAVENOUS at 10:56

## 2024-05-17 RX ADMIN — ACETAMINOPHEN 650 MG: 325 TABLET ORAL at 14:19

## 2024-05-17 RX ADMIN — TETROFOSMIN 31.1 MILLICURIE: 1.38 INJECTION, POWDER, LYOPHILIZED, FOR SOLUTION INTRAVENOUS at 10:50

## 2024-05-17 RX ADMIN — OXYBUTYNIN CHLORIDE 10 MG: 5 TABLET, EXTENDED RELEASE ORAL at 20:29

## 2024-05-17 RX ADMIN — ZOLPIDEM TARTRATE 10 MG: 10 TABLET ORAL at 20:29

## 2024-05-17 RX ADMIN — ACETAMINOPHEN 650 MG: 325 TABLET ORAL at 20:29

## 2024-05-17 RX ADMIN — KETOROLAC TROMETHAMINE 30 MG: 30 INJECTION, SOLUTION INTRAMUSCULAR at 14:13

## 2024-05-17 RX ADMIN — ATORVASTATIN CALCIUM 40 MG: 40 TABLET, FILM COATED ORAL at 20:29

## 2024-05-17 RX ADMIN — CETIRIZINE HYDROCHLORIDE 10 MG: 10 TABLET, FILM COATED ORAL at 08:23

## 2024-05-17 RX ADMIN — Medication 10 ML: at 08:23

## 2024-05-17 RX ADMIN — AMINOPHYLLINE 100 MG: 25 INJECTION, SOLUTION INTRAVENOUS at 11:00

## 2024-05-17 RX ADMIN — MIRTAZAPINE 15 MG: 15 TABLET, FILM COATED ORAL at 20:29

## 2024-05-17 RX ADMIN — DULOXETINE HYDROCHLORIDE 60 MG: 60 CAPSULE, DELAYED RELEASE ORAL at 20:29

## 2024-05-17 RX ADMIN — Medication 2 PUFF: at 21:58

## 2024-05-17 RX ADMIN — METHOCARBAMOL 750 MG: 750 TABLET ORAL at 14:50

## 2024-05-17 RX ADMIN — RISPERIDONE 2 MG: 1 TABLET, FILM COATED ORAL at 20:28

## 2024-05-17 RX ADMIN — DULOXETINE HYDROCHLORIDE 60 MG: 60 CAPSULE, DELAYED RELEASE ORAL at 08:19

## 2024-05-17 RX ADMIN — KETOROLAC TROMETHAMINE 30 MG: 30 INJECTION, SOLUTION INTRAMUSCULAR at 00:32

## 2024-05-17 RX ADMIN — PANTOPRAZOLE SODIUM 40 MG: 40 TABLET, DELAYED RELEASE ORAL at 05:22

## 2024-05-17 RX ADMIN — DOCUSATE SODIUM 100 MG: 100 CAPSULE, LIQUID FILLED ORAL at 08:19

## 2024-05-17 ASSESSMENT — PAIN SCALES - GENERAL
PAINLEVEL_OUTOF10: 3
PAINLEVEL_OUTOF10: 3
PAINLEVEL_OUTOF10: 10
PAINLEVEL_OUTOF10: 3
PAINLEVEL_OUTOF10: 10
PAINLEVEL_OUTOF10: 2
PAINLEVEL_OUTOF10: 10
PAINLEVEL_OUTOF10: 10

## 2024-05-17 ASSESSMENT — PAIN SCALES - WONG BAKER
WONGBAKER_NUMERICALRESPONSE: NO HURT

## 2024-05-17 ASSESSMENT — PAIN DESCRIPTION - LOCATION
LOCATION_2: CHEST
LOCATION: CHEST
LOCATION: HEAD
LOCATION: CHEST;HEAD
LOCATION: CHEST;HEAD
LOCATION_2: CHEST
LOCATION: HEAD
LOCATION_2: CHEST
LOCATION: HEAD

## 2024-05-17 ASSESSMENT — PAIN DESCRIPTION - ORIENTATION
ORIENTATION: MID
ORIENTATION: OTHER (COMMENT)
ORIENTATION_2: MID
ORIENTATION_2: MID
ORIENTATION: MID

## 2024-05-17 ASSESSMENT — PAIN DESCRIPTION - INTENSITY
RATING_2: 10

## 2024-05-17 ASSESSMENT — PAIN DESCRIPTION - DESCRIPTORS
DESCRIPTORS: ACHING
DESCRIPTORS: ACHING

## 2024-05-17 NOTE — PLAN OF CARE
Problem: Discharge Planning  Goal: Discharge to home or other facility with appropriate resources  5/17/2024 1035 by Chiquita Webb, RN  Outcome: Progressing     Problem: Pain  Goal: Verbalizes/displays adequate comfort level or baseline comfort level  5/17/2024 1035 by Chiquita Webb, RN  Outcome: Progressing     Problem: Safety - Adult  Goal: Free from fall injury  5/17/2024 1035 by Chiquita Webb, RN  Outcome: Progressing

## 2024-05-17 NOTE — CARE COORDINATION
Case Management Assessment  Initial Evaluation    Date/Time of Evaluation: 5/17/2024 11:30 AM  Assessment Completed by: LIZ FISCHER RN    If patient is discharged prior to next notation, then this note serves as note for discharge by case management.    Patient Name: Aster Jauregui                   YOB: 1965  Diagnosis: Chest discomfort [R07.89]  Chest pain [R07.9]                   Date / Time: 5/15/2024  7:50 PM    Patient Admission Status: Observation   Readmission Risk (Low < 19, Mod (19-27), High > 27): No data recorded  Current PCP: No primary care provider on file.  PCP verified by CM? No    Chart Reviewed: Yes      History Provided by: Patient, Medical Record, Other (see comment) (Nurse Reynoso from [facility])  Patient Orientation: Alert and Oriented, Person, Place    Patient Cognition: Alert    Hospitalization in the last 30 days (Readmission):  No    If yes, Readmission Assessment in  Navigator will be completed.    Advance Directives:      Code Status: Full Code   Patient's Primary Decision Maker is: Legal Next of Kin      Discharge Planning:    Patient expects to discharge to: [facility]  Plan for transportation at discharge:      Financial    Payor: MEDICARE / Plan: MEDICARE PART A AND B / Product Type: *No Product type* /         Current Nursing Home Information:  Patient admitted from:  [facility] ,  -home number 7  [address]""   [place] [address]  Phone [phone]  Fax [phone]     Call to Pj Reynoso with the facility  who confirmed the patient is: Long term in group home     Patient Covid vaccination status:    Internal Administration   First Dose      Second Dose           Last COVID Lab SARS-CoV-2 RNA, RT PCR (no units)   Date Value   01/16/2024 NOT DETECTED            Covid Test requirement for return: No Rapid/PCR Covid test needed before return      Additional Case Management Notes:   Patient is a resident at [facility] . CM spoke with  Nurse Faith  Who verified and stated okay for patient to go back on discharge.  The facility can pickup the patient if she is discharged before 3.00 pm, they no  after 3pm .     The Plan for Transition of Care is related to the following treatment goals of Chest discomfort [R07.89]  Chest pain [R07.9]    The Patient and/or Patient Representative Agree with the Discharge Plan? Yes    LIZ FISCHER, RN/BS/CCM  Case Management Department

## 2024-05-17 NOTE — PROGRESS NOTES
Date of Admission: 5/15/2024. Hospital Day: 3       Assessment/Plan:    Active Hospital Problems    Diagnosis     Chest pain [R07.9]       58 y.o. female who presented to ED via EMS from [facility] for evaluation of chest pain.  Patient is a poor historian.  History supplemented by chart review.  Patient was seen in ED for chest pain on 5/13.     Chest pain  - EKG shows sinus bradycardia, rate 58, nonspecific T wave abnormality  - Troponins  wnl x  - ASA, statin  - Check lipid panel   - Monitor on telemetry   - Cardiology consulted  -echo ordered  to r/p pericardial disease  . Could also be MSK d/t chest wall tenderness  -prn toradol     HTN  - Continue home Losartan     Morbid obesity due to excess calories (Body mass index is 43.88 kg/m².)   - Complicating assessment and treatment.   - Obesity places the patient at risk for multiple co-morbidities as well as early death and may be contributing to the patient's presentation.   - Supportive care.  - Encourage therapeutic lifestyle changes.      Asthma without exacerbation  - Continue home regimen     Psychotic disorder  - Continue home regimen    DVT Prophylaxis:    Diet: Diet NPO Exceptions are: Sips of Water with Meds  Code Status: Full Code      Dispo - home    All  follow up labs and imaging personally reviewed      Chief Complaint:   Chief Complaint   Patient presents with    Chest Pain    Headache     Pt to ED from [facility] via [place] ems with c/o chest pain and headache since Monday. Pt was seen here on Monday for the same thing.          Interval  History:   Appear comfortable. Report 'punching like pain central chest, tenderness lt chest wall , constant.        Medications:  Reviewed    Infusion Medications    sodium chloride       Scheduled Medications    lidocaine  1 patch TransDERmal Daily    methocarbamol  750 mg Oral TID    aspirin  81 mg Oral Nightly    docusate sodium  100 mg Oral Daily    DULoxetine  60 mg Oral BID    cetirizine  10  cetirizine  10 mg Oral Daily    losartan  100 mg Oral Daily    mirtazapine  15 mg Oral Nightly    pantoprazole  40 mg Oral QAM AC    oxyBUTYnin  10 mg Oral Nightly    risperiDONE  1 mg Oral QAM    risperiDONE  2 mg Oral Nightly    senna  8.6 mg Oral Daily    zolpidem  10 mg Oral Nightly    sodium chloride flush  10 mL IntraVENous 2 times per day    atorvastatin  40 mg Oral Nightly    enoxaparin  40 mg SubCUTAneous Daily    mometasone-formoterol  2 puff Inhalation BID RT    tiotropium  2 puff Inhalation Daily RT     PRN Meds: albuterol sulfate HFA, sodium chloride flush, sodium chloride, ondansetron, acetaminophen **OR** acetaminophen, senna, nitroGLYCERIN, perflutren lipid microspheres, ketorolac      Intake/Output Summary (Last 24 hours) at 5/17/2024 1416  Last data filed at 5/17/2024 1013  Gross per 24 hour   Intake 10 ml   Output 240 ml   Net -230 ml       Physical Exam Performed:    /81   Pulse 53   Temp 97 °F (36.1 °C) (Temporal)   Resp 16   Ht 1.372 m (4' 6\")   Wt 85.6 kg (188 lb 11.2 oz)   SpO2 97%   BMI 45.50 kg/m²     General appearance: No apparent distress, appears stated age and cooperative.  Respiratory:  Normal respiratory effort. Clear to auscultation, bilaterally without Rales/Wheezes/Rhonchi.  Cardiovascular: Regular rate and rhythm with normal S1/S2 without murmurs, rubs or gallops.  Abdomen: Soft, non-tender, non-distended with normal bowel sounds.  Musculoskeletal: No clubbing, cyanosis or edema bilaterally.  Full range of motion without deformity.  Skin: Skin color, texture, turgor normal.  No rashes or lesions.  Neurologic:  Neurovascularly intact without any focal sensory/motor deficits. Cranial nerves: II-XII intact, grossly non-focal.  Psychiatric: Alert and oriented, thought content appropriate, normal insight      Labs:   Recent Labs     05/15/24  2021 05/16/24  0433   WBC 6.3 5.6   HGB 12.5 11.1*   HCT 36.0 32.2*    323     Recent Labs     05/15/24  2021 05/16/24  0433

## 2024-05-17 NOTE — PLAN OF CARE
Problem: Discharge Planning  Goal: Discharge to home or other facility with appropriate resources  5/16/2024 2152 by Mable Bruner RN  Outcome: Progressing     Problem: Pain  Goal: Verbalizes/displays adequate comfort level or baseline comfort level  5/16/2024 2152 by Mable Bruner RN  Outcome: Progressing     Problem: Safety - Adult  Goal: Free from fall injury  5/16/2024 2152 by Mable Bruner RN  Outcome: Progressing

## 2024-05-17 NOTE — PROGRESS NOTES
[facility]  Progress notes  [phone]      Chief Complaint   Patient presents with    Chest Pain    Headache     Pt to ED from [facility] via [place] ems with c/o chest pain and headache since Monday. Pt was seen here on Monday for the same thing.             History of Present Illness:  Aster Jaureugi is a 58 y.o. patient who presented to the hospital with complaints of chest discomfort. I have been asked to provide consultation regarding further management and testing. The patient reports that she grew up in [place] and lived in [place] until January 9 of this year, when she moved into a group home with 8 other people. 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 She states that she exercises two times a week and feels good when she does this. She states that she does 100 squats and rides a bicycle. No associated chest pain or pressure. She reports that she came to the hospital after developing chest discomfort on Monday. IT has been constant since then. She reports that it is centrally located, nonradiation chest discomfort. No change with respiration, exercise, or food. She denies any chest trauma or car accidents. She tried some tylenol and tums, which did not change her symptoms. She states that she has never had this discomfort before. She has been taking diclofenac for generalized pain. No associated melena or dysphagia.     Subjective: no acute events overnight. Patient with constant chest pain, which is unchanged.     Past Medical History:   has a past medical history of Allergic rhinitis, Alopecia, Anterolisthesis of lumbar spine, Anxiety, Asthma, Constipation, Depression, GERD (gastroesophageal reflux disease), Hyperopia, Hypertension, Insomnia, Mild intellectual disability, Obstructive sleep apnea, Osteoarthritis of both knees, Osteopenia, Ovarian cyst, Overactive bladder, Periumbilical hernia, Presbyopia, and Psychotic disorder (HCC).    Surgical History:    obtain echocardiogram with small-to-moderate pericardial effusion that is chronic. No history of inflammatory arthritis or prodromal viral illness to suggest pericarditis. Follow up echo in 1 month   - stress test to evaluate for ischemia  - evaluation of alternative sources of chest pain per primary team     2. Anemia  - new problem   Plan  - stop nsaid, give ppi and gi cocktail   - iron studies  - consider gi evaluation    3. Essential hypertension  - stable  Plan  - continue losartan     4. Family history of chf in mother    5. Obesity    6. JOSE A on cpap    7. History of bronchomalacia         I will address the patient's cardiac risk factors and adjusted pharmacologic treatment as needed. In addition, I have reinforced the need for patient directed risk factor modification.    Tobacco use was discussed with the patient and educated on the negative effects. I have asked the patient to not utilize these agents.    Thank you for allowing to us to participate in the care or Aster Jauregui. Further evaluation will be based upon the patient's clinical course and testing results.    All questions and concerns were addressed to the patient/family. Alternatives to my treatment were discussed. The note was completed using EMR. Every effort was made to ensure accuracy; however, inadvertent computerized transcription errors may be present.       All questions and concerns were addressed to the patient/family. Alternatives to my treatment were discussed. The note was completed using EMR. Every effort was made to ensure accuracy; however, inadvertent computerized transcription errors may be present.  ALEN MITCHELL DO, MD 5/17/2024 1:21 PM

## 2024-05-17 NOTE — CARE COORDINATION
05/17/24 1553   IMM Letter   IMM Letter date given: 05/17/24   IMM Letter time given: 1530  (Patient updated to Inpatient)

## 2024-05-18 VITALS
BODY MASS INDEX: 43.46 KG/M2 | HEART RATE: 70 BPM | SYSTOLIC BLOOD PRESSURE: 125 MMHG | HEIGHT: 55 IN | OXYGEN SATURATION: 98 % | WEIGHT: 187.8 LBS | DIASTOLIC BLOOD PRESSURE: 43 MMHG | RESPIRATION RATE: 18 BRPM | TEMPERATURE: 98 F

## 2024-05-18 PROCEDURE — 6370000000 HC RX 637 (ALT 250 FOR IP): Performed by: STUDENT IN AN ORGANIZED HEALTH CARE EDUCATION/TRAINING PROGRAM

## 2024-05-18 PROCEDURE — 6370000000 HC RX 637 (ALT 250 FOR IP): Performed by: PHYSICIAN ASSISTANT

## 2024-05-18 PROCEDURE — 94640 AIRWAY INHALATION TREATMENT: CPT

## 2024-05-18 PROCEDURE — 6360000002 HC RX W HCPCS: Performed by: PHYSICIAN ASSISTANT

## 2024-05-18 PROCEDURE — 2580000003 HC RX 258: Performed by: PHYSICIAN ASSISTANT

## 2024-05-18 PROCEDURE — 99232 SBSQ HOSP IP/OBS MODERATE 35: CPT | Performed by: NURSE PRACTITIONER

## 2024-05-18 PROCEDURE — 94761 N-INVAS EAR/PLS OXIMETRY MLT: CPT

## 2024-05-18 RX ADMIN — SENNOSIDES 8.6 MG: 8.6 TABLET, FILM COATED ORAL at 08:03

## 2024-05-18 RX ADMIN — TIOTROPIUM BROMIDE INHALATION SPRAY 2 PUFF: 3.12 SPRAY, METERED RESPIRATORY (INHALATION) at 07:55

## 2024-05-18 RX ADMIN — CETIRIZINE HYDROCHLORIDE 10 MG: 10 TABLET, FILM COATED ORAL at 08:03

## 2024-05-18 RX ADMIN — DOCUSATE SODIUM 100 MG: 100 CAPSULE, LIQUID FILLED ORAL at 08:03

## 2024-05-18 RX ADMIN — PANTOPRAZOLE SODIUM 40 MG: 40 TABLET, DELAYED RELEASE ORAL at 05:25

## 2024-05-18 RX ADMIN — RISPERIDONE 1 MG: 1 TABLET, FILM COATED ORAL at 08:03

## 2024-05-18 RX ADMIN — Medication 2 PUFF: at 07:55

## 2024-05-18 RX ADMIN — Medication 10 ML: at 09:28

## 2024-05-18 RX ADMIN — METHOCARBAMOL 750 MG: 750 TABLET ORAL at 08:03

## 2024-05-18 RX ADMIN — DULOXETINE HYDROCHLORIDE 60 MG: 60 CAPSULE, DELAYED RELEASE ORAL at 08:03

## 2024-05-18 RX ADMIN — ENOXAPARIN SODIUM 40 MG: 100 INJECTION SUBCUTANEOUS at 08:02

## 2024-05-18 RX ADMIN — LOSARTAN POTASSIUM 100 MG: 100 TABLET, FILM COATED ORAL at 08:03

## 2024-05-18 NOTE — PROGRESS NOTES
[facility]   Cardiology Progress Note     Date: 5/18/2024  Admit Date: 5/15/2024     Reason for consultation: CP    Chief Complaint:   Chief Complaint   Patient presents with    Chest Pain    Headache     Pt to ED from [facility] via [place] [facility] with c/o chest pain and headache since Monday. Pt was seen here on Monday for the same thing.        History of Present Illness: History obtained from patient and medical record.     Aster Jauregui is a 58 y.o. female with a past medical history of GERD, HTN, obesity, JOSE A.     Pt presented to hospital with chest discomfort. She reports that she came to the hospital after developing chest discomfort on Monday. IT has been constant since then. She reports that it is centrally located, nonradiation chest discomfort. No change with respiration, exercise, or food. She denies any chest trauma or car accidents. She tried some tylenol and tums, which did not change her symptoms. She states that she has never had this discomfort before.     Interval Hx: Today, she is being seen for follow up. She is up in the chair. She is doing fair. She is still having reproducible chest pain on exam. Pt remains hemodynamically stable in sinus rhythm.     Patient seen and examined. Clinical notes reviewed. Telemetry reviewed.  No new complaints today. No major events overnight.   Denies having chest pain, palpitations, shortness of breath, orthopnea/PND, cough, or dizziness at the time of this visit.    Allergies:  No Known Allergies    Home Meds:  Prior to Visit Medications    Medication Sig Taking? Authorizing Provider   ibuprofen (ADVIL;MOTRIN) 600 MG tablet Take 1 tablet by mouth 3 times daily as needed for Pain Yes Nicholas Mccauley MD   losartan (COZAAR) 100 MG tablet Take 1 tablet by mouth daily  ProviderKalie MD   mirtazapine (REMERON) 15 MG tablet Take 1 tablet by mouth nightly  ProviderKalie MD   omeprazole (PRILOSEC) 20 MG delayed release capsule Take 1

## 2024-05-18 NOTE — DISCHARGE INSTR - COC
Continuity of Care Form    Patient Name: Aster Jauregui   :  1965  MRN:  [id number]    Admit date:  5/15/2024  Discharge date:  2024    Code Status Order: Full Code   Advance Directives:     Admitting Physician:  Nicholas Mccauley MD  PCP: No primary care provider on file.    Discharging Nurse: Yumiko Merritt RN  Discharging Hospital Unit/Room#: Y5G-2969/5918-01  Discharging Unit Phone Number: [phone]    Emergency Contact:   Extended Emergency Contact Information  Primary Emergency Contact: afia kim  Home Phone: [phone]  Relation: Friend    Past Surgical History:  History reviewed. No pertinent surgical history.    Immunization History:     There is no immunization history on file for this patient.    Active Problems:  Patient Active Problem List   Diagnosis Code    Chest pain R07.9       Isolation/Infection:   Isolation            Contact          Patient Infection Status       Infection Onset Added Last Indicated Last Indicated By Review Planned Expiration Resolved Resolved By    MDRO (multi-drug resistant organism) 23 Mack Robertson        Added from external infection.                       Nurse Assessment:  Last Vital Signs: BP (!) 125/43   Pulse 70   Temp 98 °F (36.7 °C) (Temporal)   Resp 18   Ht 1.372 m (4' 6\")   Wt 85.2 kg (187 lb 12.8 oz)   SpO2 98%   BMI 45.28 kg/m²     Last documented pain score (0-10 scale): Pain Level: 10  Last Weight:   Wt Readings from Last 1 Encounters:   24 85.2 kg (187 lb 12.8 oz)     Mental Status:  oriented and alert    IV Access:  - None    Nursing Mobility/ADLs:  Walking   Assisted  Transfer  Independent  Bathing  Independent  Dressing  Independent  Toileting  Assisted  Feeding  Independent  Med Admin  Assisted  Med Delivery   whole    Wound Care Documentation and Therapy:        Elimination:  Continence:   Bowel: Yes  Bladder: Yes  Urinary Catheter: None   Colostomy/Ileostomy/Ileal Conduit: No       Date of      Update Admission H&P: No change in H&P    PHYSICIAN SIGNATURE:  Electronically signed by Nicholas Mccauley MD on 5/18/24 at 9:55 AM EDT

## 2024-05-18 NOTE — PLAN OF CARE
Problem: Discharge Planning  Goal: Discharge to home or other facility with appropriate resources  5/18/2024 0900 by Yumiko Merritt RN  Outcome: Completed  5/17/2024 2221 by Mable Bruner RN  Outcome: Progressing     Problem: Pain  Goal: Verbalizes/displays adequate comfort level or baseline comfort level  5/18/2024 0900 by Yumiko Merritt RN  Outcome: Completed  5/17/2024 2221 by Mable Bruner RN  Outcome: Progressing     Problem: Safety - Adult  Goal: Free from fall injury  5/18/2024 0900 by Yumiko Merritt RN  Outcome: Completed  5/17/2024 2221 by Mable Bruner RN  Outcome: Progressing

## 2024-05-18 NOTE — PROGRESS NOTES
Report called to facility. Spoke with Kavitha. Transportation set up for noon. Pt spoke with Godmother Melanie on the phone and this nurse assisted with updating her.

## 2024-05-18 NOTE — PROGRESS NOTES
Data- discharge order received, pt and Godmother Melanie (appointed legal authority) verbalized agreement to discharge, disposition to [facility], ARSENIO reviewed and signed by physician.    Action- AVS prepared, ARSENIO completed/ reported faxed by case management/. Discharge instruction summary: Diet- general, Activity- as toelrated, immunizations reviewed and updated, medications prescriptions to be filled at receiving facility except for the controlled prescriptions to be sent: , Transfer code status: Full Code, LDAs to remain with discharge: none.   DME used: none.     Response- Bedside RN to call report to receiving facility. Pt belongings gathered, peripheral IV and cardiac monitoring removed. Disposition to Discharged via [facility] to [facility] by EMS transportation, no complications reported.       1. WEIGHT: Admit Weight - Scale: 82.6 kg (182 lb) (05/15/24 1954)        Today  Weight - Scale: 85.2 kg (187 lb 12.8 oz) (05/18/24 0403)       2. O2 SAT.: SpO2: 98 % (05/18/24 0802)    Clofazimine Pregnancy And Lactation Text: This medication is Pregnancy Category C and isn't considered safe during pregnancy. It is excreted in breast milk.

## 2024-05-18 NOTE — PLAN OF CARE
Problem: Discharge Planning  Goal: Discharge to home or other facility with appropriate resources  5/17/2024 2221 by Mable Bruner RN  Outcome: Progressing     Problem: Pain  Goal: Verbalizes/displays adequate comfort level or baseline comfort level  5/17/2024 2221 by Mable Bruner RN  Outcome: Progressing     Problem: Safety - Adult  Goal: Free from fall injury  5/17/2024 2221 by Mable Bruner RN  Outcome: Progressing

## 2024-05-18 NOTE — CARE COORDINATION
Case Management -  Discharge Note      Patient Name: Aster Jauregui                   YOB: 1965            Readmission Risk (Low < 19, Mod (19-27), High > 27): Readmission Risk Score: 13.7    Current PCP: No primary care provider on file.    (IMM) Important Message from Medicare:    Date: 5/17/2024    PT AM-PAC:   /24  OT AM-PAC:   /24    Patient noted to have a discharge order.  Pt has been medically cleared to return to LTC (Long Term Care)    Patient discharged to   [facility] ,  -home number 7  [address] [place] [address]  Phone [phone]  Fax [phone]        Transportation scheduled for 12:00  Transportation provided by [facility]  ([phone])    AVS faxed and agency notified: Yes and spoke with Korin.  Family Notified: CM called Patient's Friend- Melanie [phone]. Went to  and VM was generic. Patient is alert and can call family.   Nurse to call report to facility [phone]        Financial    Payor: MEDICARE / Plan: MEDICARE PART A AND B / Product Type: *No Product type* /     Pharmacy:  Potential assistance Purchasing Medications: No  Meds-to-Beds request: No    No Pharmacies Listed    Notes:    Additional Case Management Notes: All CM needs met.         Electronically signed by ALPHONSE Moore on 5/18/2024 at 10:31 AM

## 2024-05-21 ENCOUNTER — TELEPHONE (OUTPATIENT)
Dept: CARDIOLOGY CLINIC | Age: 59
End: 2024-05-21

## 2024-05-21 DIAGNOSIS — I31.39 PERICARDIAL EFFUSION: Primary | ICD-10-CM

## 2024-05-21 NOTE — PROGRESS NOTES
[facility]   Cardiac Consultation    Referring Provider:  No primary care provider on file.     Chief Complaint   Patient presents with    New Patient     New Pt complaints of no chest pain today        History of Present Illness:  Aster is a 58 y.o. female who's history includes asthma, depression, GERD, hypertension, intellectual disability, anxiety, obesity, migraines, JOSE A.    She presented to ED 5/15/24 with complaint of chest discomfort. Reported was just recently seen in ER on Monday for chest discomfort. Reported a discharge home. Patient reported constant substernal chest pain since discharge on Monday. Denied any aggravating leaving factors. Stated she does feel short of breath. Stated associated headache. Patient relatively poor historian. Tried GI cocktail, Reglan 10 mg IV, Benadryl 25 mg IV, 1 L normal saline IV fluid bolus here in the emergency department. Upon repeat evaluation patient reported no improvement in symptoms. Troponin negative x 2. CBC with normal white count, hemoglobin and platelets. CMP unremarkable. BNP normal. Lipase normal. Given the fact this is her second visit for symptoms and 2 days and she had no improvement of symptoms with GI cocktail here in the emergency department may benefit from admission for cardiac evaluation as it appears that she has not had 1 documented in her history. Case discussed with hospital service for admission.     Today she is here as a new patient for chest discomfort. She is from [facility].  She is with her \".\"  She is feeling \"great.\"  No chest pain today. She likes to listen to music >  gospel and blues, everything except rap.  She went to ER recently (May 31) with chest pain and per pt, was told she \"pulled a muscle\" when doing squats - she tries to do 100 squats twice per day.  Heart and lungs sound good. She is ambulating with a walker.     Past Medical History:   has a past medical history of Allergic

## 2024-05-22 NOTE — DISCHARGE SUMMARY
Hospitalist Discharge Summary     Aster Jauregui  : 1965  MRN: [id number]    Admit date: 5/15/2024  Discharge date: 2024    Admitting Physician: Nicholas Mccauley MD    Discharge Diagnoses:   Patient Active Problem List   Diagnosis    Chest pain       Admission Condition: fair    Discharged Condition: fair    Discharge Exam:  VITALS:  BP (!) 125/43   Pulse 70   Temp 98 °F (36.7 °C) (Temporal)   Resp 18   Ht 1.372 m (4' 6\")   Wt 85.2 kg (187 lb 12.8 oz)   SpO2 98%   BMI 45.28 kg/m²   CONSTITUTIONAL:  awake, alert, cooperative, no apparent distress, and appears stated age  LUNGS:  clear to auscultation bilaterally, No increased work of breathing, good air exchange, no crackles or wheezing  CARDIOVASCULAR:  Regular rate and rhythm, normal S1 and S2, no S3 or S4, and no significant murmurs, rubs or gallops noted.   ABDOMEN:  Normal active bowel sounds, soft, non-tender, non-distended, no masses palpated,  MUSCULOSKELETAL: rt chest wall tenderness  NEUROLOGIC:  Awake, alert, oriented to name, place and time.  Cranial nerves II-XII are grossly intact.    SKIN:  normal skin color, texture, turgor for age.    Hospital Course: 58 F  admitted for chest pain. Echo show chronic small pericardial effusion, no other signs to suggest nilesh or myocarditis. Stress test wnl.  Suspect msk pain, cont nsaid and topical analgesic  Chief Complaint   Patient presents with    Chest Pain    Headache     Pt to ED from [facility] via [facility] [facility] with c/o chest pain and headache since Monday. Pt was seen here on Monday for the same thing.         Core Measures:   Acute Myocardial Infarction,  Heart failure,  CVA    Consults: cardiology    Imaging Studies:  Nuclear stress test with myocardial perfusion    Result Date: 2024    Stress Combined Conclusion: Normal pharmacological myocardial perfusion study. Findings suggest a low risk of cardiac events.   Stress Function: Left ventricular function post-stress is normal.  expiratory phase versus mild edema. 4. Cardiomegaly with small pericardial effusion.       Other Significant Diagnostic Studies: As described above     Treatments: As described above    Disposition: long term care facility    Discharge Medications:       Medication List        CONTINUE taking these medications      acetaminophen 325 MG tablet  Commonly known as: TYLENOL     aspirin 81 MG chewable tablet     diclofenac 75 MG EC tablet  Commonly known as: VOLTAREN     Docusate Sodium 100 MG Tabs     DULoxetine 60 MG extended release capsule  Commonly known as: CYMBALTA     furosemide 20 MG tablet  Commonly known as: LASIX     guaiFENesin 100 MG/5ML liquid  Commonly known as: ROBITUSSIN     loperamide 2 MG capsule  Commonly known as: IMODIUM     loratadine 10 MG tablet  Commonly known as: CLARITIN     losartan 100 MG tablet  Commonly known as: COZAAR     mirtazapine 15 MG tablet  Commonly known as: REMERON     nitroGLYCERIN 0.4 MG SL tablet  Commonly known as: NITROSTAT     omeprazole 20 MG delayed release capsule  Commonly known as: PRILOSEC     ondansetron 4 MG disintegrating tablet  Commonly known as: ZOFRAN-ODT     oxyBUTYnin 10 MG extended release tablet  Commonly known as: DITROPAN-XL     * risperiDONE 1 MG tablet  Commonly known as: RISPERDAL     * risperiDONE 2 MG tablet  Commonly known as: RISPERDAL     Sennosides 8.6 MG Caps     Trelegy Ellipta 200-62.5-25 MCG/ACT Aepb inhaler  Generic drug: fluticasone-umeclidin-vilant     Ventolin  (90 Base) MCG/ACT inhaler  Generic drug: albuterol sulfate HFA     Walker Misc  1 each by Does not apply route daily     zolpidem 10 MG tablet  Commonly known as: AMBIEN           * This list has 2 medication(s) that are the same as other medications prescribed for you. Read the directions carefully, and ask your doctor or other care provider to review them with you.                  33 Minutes spent on patient evaluation, counseling and discharge planning.      Signed:  Nicholas Mccauley MD  5/22/2024, 8:04 AM **

## 2024-05-23 ENCOUNTER — TELEPHONE (OUTPATIENT)
Dept: PULMONOLOGY | Age: 59
End: 2024-05-23

## 2024-05-23 NOTE — TELEPHONE ENCOUNTER
Received call from Yamile Garcia with [facility] - wanting to schedule NPV. Patient on CPAP, but wanting to come off and needs to be evaluated/get another sleep study. Scheduled in FF with Dr. Casey - advised that we will need any sleep study records and to bring machine to appointment.     Contact for Yamile is [phone].

## 2024-05-31 ENCOUNTER — HOSPITAL ENCOUNTER (EMERGENCY)
Age: 59
Discharge: HOME | End: 2024-05-31
Attending: EMERGENCY MEDICINE
Payer: MEDICARE

## 2024-05-31 ENCOUNTER — APPOINTMENT (OUTPATIENT)
Dept: GENERAL RADIOLOGY | Age: 59
End: 2024-05-31
Payer: MEDICARE

## 2024-05-31 VITALS
HEART RATE: 58 BPM | RESPIRATION RATE: 15 BRPM | DIASTOLIC BLOOD PRESSURE: 69 MMHG | OXYGEN SATURATION: 100 % | TEMPERATURE: 98.2 F | SYSTOLIC BLOOD PRESSURE: 121 MMHG

## 2024-05-31 DIAGNOSIS — R07.9 NONSPECIFIC CHEST PAIN: Primary | ICD-10-CM

## 2024-05-31 LAB
ANION GAP SERPL CALCULATED.3IONS-SCNC: 10 MMOL/L (ref 3–16)
BASOPHILS # BLD: 0.1 K/UL (ref 0–0.2)
BASOPHILS NFR BLD: 1.1 %
BUN SERPL-MCNC: 14 MG/DL (ref 7–20)
CALCIUM SERPL-MCNC: 9 MG/DL (ref 8.3–10.6)
CHLORIDE SERPL-SCNC: 103 MMOL/L (ref 99–110)
CO2 SERPL-SCNC: 25 MMOL/L (ref 21–32)
CREAT SERPL-MCNC: 0.7 MG/DL (ref 0.6–1.1)
D-DIMER QUANTITATIVE: 0.53 UG/ML FEU (ref 0–0.6)
DEPRECATED RDW RBC AUTO: 13.4 % (ref 12.4–15.4)
EKG ATRIAL RATE: 61 BPM
EKG DIAGNOSIS: NORMAL
EKG P AXIS: 36 DEGREES
EKG P-R INTERVAL: 144 MS
EKG Q-T INTERVAL: 448 MS
EKG QRS DURATION: 84 MS
EKG QTC CALCULATION (BAZETT): 450 MS
EKG R AXIS: 28 DEGREES
EKG T AXIS: 29 DEGREES
EKG VENTRICULAR RATE: 61 BPM
EOSINOPHIL # BLD: 0.2 K/UL (ref 0–0.6)
EOSINOPHIL NFR BLD: 2.7 %
GFR SERPLBLD CREATININE-BSD FMLA CKD-EPI: >90 ML/MIN/{1.73_M2}
GLUCOSE SERPL-MCNC: 73 MG/DL (ref 70–99)
HCT VFR BLD AUTO: 34.4 % (ref 36–48)
HGB BLD-MCNC: 11.9 G/DL (ref 12–16)
LIPASE SERPL-CCNC: 22 U/L (ref 13–60)
LYMPHOCYTES # BLD: 1.3 K/UL (ref 1–5.1)
LYMPHOCYTES NFR BLD: 22.4 %
MCH RBC QN AUTO: 31.1 PG (ref 26–34)
MCHC RBC AUTO-ENTMCNC: 34.8 G/DL (ref 31–36)
MCV RBC AUTO: 89.5 FL (ref 80–100)
MONOCYTES # BLD: 0.6 K/UL (ref 0–1.3)
MONOCYTES NFR BLD: 11.2 %
NEUTROPHILS # BLD: 3.6 K/UL (ref 1.7–7.7)
NEUTROPHILS NFR BLD: 62.6 %
PLATELET # BLD AUTO: 381 K/UL (ref 135–450)
PMV BLD AUTO: 7.2 FL (ref 5–10.5)
POTASSIUM SERPL-SCNC: 4.2 MMOL/L (ref 3.5–5.1)
RBC # BLD AUTO: 3.84 M/UL (ref 4–5.2)
SODIUM SERPL-SCNC: 138 MMOL/L (ref 136–145)
TROPONIN, HIGH SENSITIVITY: 11 NG/L (ref 0–14)
TROPONIN, HIGH SENSITIVITY: 14 NG/L (ref 0–14)
WBC # BLD AUTO: 5.7 K/UL (ref 4–11)

## 2024-05-31 PROCEDURE — 71045 X-RAY EXAM CHEST 1 VIEW: CPT

## 2024-05-31 PROCEDURE — 93005 ELECTROCARDIOGRAM TRACING: CPT

## 2024-05-31 PROCEDURE — 93010 ELECTROCARDIOGRAM REPORT: CPT | Performed by: INTERNAL MEDICINE

## 2024-05-31 PROCEDURE — 99285 EMERGENCY DEPT VISIT HI MDM: CPT

## 2024-05-31 PROCEDURE — 85025 COMPLETE CBC W/AUTO DIFF WBC: CPT

## 2024-05-31 PROCEDURE — 83690 ASSAY OF LIPASE: CPT

## 2024-05-31 PROCEDURE — 96374 THER/PROPH/DIAG INJ IV PUSH: CPT

## 2024-05-31 PROCEDURE — 80048 BASIC METABOLIC PNL TOTAL CA: CPT

## 2024-05-31 PROCEDURE — 6360000002 HC RX W HCPCS: Performed by: EMERGENCY MEDICINE

## 2024-05-31 PROCEDURE — 6370000000 HC RX 637 (ALT 250 FOR IP): Performed by: EMERGENCY MEDICINE

## 2024-05-31 PROCEDURE — 84484 ASSAY OF TROPONIN QUANT: CPT

## 2024-05-31 PROCEDURE — 85379 FIBRIN DEGRADATION QUANT: CPT

## 2024-05-31 RX ORDER — METHOCARBAMOL 500 MG/1
750 TABLET, FILM COATED ORAL ONCE
Status: COMPLETED | OUTPATIENT
Start: 2024-05-31 | End: 2024-05-31

## 2024-05-31 RX ORDER — OXYCODONE HYDROCHLORIDE AND ACETAMINOPHEN 5; 325 MG/1; MG/1
1 TABLET ORAL ONCE
Status: COMPLETED | OUTPATIENT
Start: 2024-05-31 | End: 2024-05-31

## 2024-05-31 RX ORDER — ACETAMINOPHEN 325 MG/1
325 TABLET ORAL ONCE
Status: COMPLETED | OUTPATIENT
Start: 2024-05-31 | End: 2024-05-31

## 2024-05-31 RX ORDER — KETOROLAC TROMETHAMINE 15 MG/ML
15 INJECTION, SOLUTION INTRAMUSCULAR; INTRAVENOUS ONCE
Status: COMPLETED | OUTPATIENT
Start: 2024-05-31 | End: 2024-05-31

## 2024-05-31 RX ORDER — METHOCARBAMOL 750 MG/1
750 TABLET, FILM COATED ORAL EVERY 8 HOURS PRN
Qty: 15 TABLET | Refills: 0 | Status: SHIPPED | OUTPATIENT
Start: 2024-05-31 | End: 2024-06-10

## 2024-05-31 RX ADMIN — KETOROLAC TROMETHAMINE 15 MG: 15 INJECTION, SOLUTION INTRAMUSCULAR; INTRAVENOUS at 15:23

## 2024-05-31 RX ADMIN — ACETAMINOPHEN 325 MG: 325 TABLET ORAL at 15:21

## 2024-05-31 RX ADMIN — OXYCODONE HYDROCHLORIDE AND ACETAMINOPHEN 1 TABLET: 5; 325 TABLET ORAL at 15:21

## 2024-05-31 RX ADMIN — METHOCARBAMOL 750 MG: 500 TABLET ORAL at 18:25

## 2024-05-31 ASSESSMENT — PAIN DESCRIPTION - DESCRIPTORS
DESCRIPTORS: ACHING
DESCRIPTORS: DISCOMFORT

## 2024-05-31 ASSESSMENT — PAIN SCALES - GENERAL
PAINLEVEL_OUTOF10: 10
PAINLEVEL_OUTOF10: 10

## 2024-05-31 ASSESSMENT — PAIN - FUNCTIONAL ASSESSMENT: PAIN_FUNCTIONAL_ASSESSMENT: PREVENTS OR INTERFERES SOME ACTIVE ACTIVITIES AND ADLS

## 2024-05-31 ASSESSMENT — PAIN DESCRIPTION - ORIENTATION: ORIENTATION: MID

## 2024-05-31 ASSESSMENT — PAIN DESCRIPTION - LOCATION
LOCATION: CHEST
LOCATION: CHEST

## 2024-06-01 NOTE — ED PROVIDER NOTES
EMERGENCY DEPARTMENT PROVIDER NOTE    Patient Identification  Pt Name: Aster Jauregui  MRN: [id number]  Birthdate 1965  Date of evaluation: 5/31/2024  Provider: Juve Downing DO  PCP: No primary care provider on file.    Chief Complaint  Chest Pain (Came by [facility] from [facility] with reports of mid sternal CP after working out. Took 3 nitro at facility and 324 mg ASA in squad)      HPI  (History provided by patient)  This is a 58 y.o. female with pertinent past medical history of hypertension, asthma, anxiety, psychiatric disorder who was brought in by EMS transportation from her nursing facility for chest pain.  Patient reports symptoms ongoing off and on for weeks, prior to arrival today she was working out when she had a twinge in her chest.  Feels aching, worsened with touch and movement, patient believes she pulled a muscle.  Told nursing home staff about her symptoms and was thus sent to the emergency department for further evaluation.  She denies any fevers, chills, shortness of breath or abdominal pain. Patient was admitted to this facility for similar symptoms on 5/15/2024 during which she had reassuring cardiac evaluation and stress testing.      I have reviewed the following nursing documentation:  Allergies: Patient has no known allergies.    Past medical history:   Past Medical History:   Diagnosis Date    Allergic rhinitis     Alopecia     Anterolisthesis of lumbar spine     Anxiety     Asthma     Constipation     Depression     GERD (gastroesophageal reflux disease)     Hyperopia     Hypertension     Insomnia     Mild intellectual disability     Obstructive sleep apnea     Osteoarthritis of both knees     Osteopenia     Ovarian cyst     Overactive bladder     Periumbilical hernia     Presbyopia     Psychotic disorder (HCC)      Past surgical history: History reviewed. No pertinent surgical history.    Home medications:   Discharge Medication List as of 5/31/2024  7:30 PM     [facility]  ENT CONSULT NOTE    REQUESTING PROVIDER:  Jay White MD      CHIEF COMPLAINT:  Mouth/Lip Problem (per Dr. White,  skin lesion right side corner)      SUBJECTIVE:  Nilton Pompa is a 64 year old male seen in consultation today at the request of Jay White MD for facial lesion near the right commissure. Patient believes this is been there for 2-3 months. Does not fluctuate in size except for when he tries to squeezer popped the lesion, he says it gets a low bit smaller, and clear fluid comes out of multiple pores in his face surrounding the lesion but then it returns to its previous size. Does not ulcerate or bleed. It is not painful, however he feels it is very obvious and visible. He has no prior history of cancer anywhere in his body, no history of skin cancer. He does complain of diffuse skin dryness, feels he is Jose tried things for this and prefers to be referred to dermatology.    Patient has an artificial heart valves, is on Coumadin for this. No other blood thinners.      HISTORIES:  ALLERGIES:   Allergen Reactions   • Viagra    • Pollen Other (See Comments)     Runny nose     Current Outpatient Medications   Medication   • atorvastatin (LIPITOR) 20 MG tablet   • metoPROLOL succinate (TOPROL-XL) 25 MG 24 hr tablet   • DULoxetine (CYMBALTA) 60 MG capsule   • warfarin (COUMADIN) 1 MG tablet   • warfarin (COUMADIN) 3 MG tablet   • fluticasone (FLONASE) 50 MCG/ACT nasal spray   • gabapentin (NEURONTIN) 600 MG tablet   • Misc Natural Products (GLUCOS-CHONDROIT-MSM COMPLEX PO)     No current facility-administered medications for this visit.      Past Medical History:   Diagnosis Date   • Aortic aneurysm (CMS/HCC)    • Aortic stenosis    • CAD (coronary artery disease)    • Chronic back pain    • Chronic neck pain    • ED (erectile dysfunction)    • HLD (hyperlipidemia)    • HTN (hypertension)      Past Surgical History:   Procedure Laterality Date   • Aortic valve  replacement     • Echocardiogram     • Lumbar spine fusion,anter apph  02/08/2010   • Occult blood test tube  06/27/2011     Social History     Socioeconomic History   • Marital status: /Civil Union     Spouse name: None   • Number of children: None   • Years of education: None   • Highest education level: None   Social Needs   • Financial resource strain: None   • Food insecurity - worry: None   • Food insecurity - inability: None   • Transportation needs - medical: None   • Transportation needs - non-medical: None   Occupational History   • None   Tobacco Use   • Smoking status: Never Smoker   • Smokeless tobacco: Never Used   Substance and Sexual Activity   • Alcohol use: No     Comment: occasionally just a little bit   • Drug use: No   • Sexual activity: None   Other Topics Concern   • None   Social History Narrative   • None     Family History   Problem Relation Age of Onset   • Cancer Father        REVIEW OF SYSTEMS:  12 point review of system negative except:  Voice change    PHYSICAL EXAM:  Vital Signs:   height is 5' 8\" (1.727 m) and weight is 83 kg. His temperature is 97.8 °F (36.6 °C). His blood pressure is 128/82.     12/20/2018    Vitals:    12/19/18 1316   BP: 128/82   Temp: 97.8 °F (36.6 °C)       On exam, patient is alert, oriented x3, well-developed, well-nourished, in no apparent distress.  Patient is attentive and responds to questions appropriately.  Voice is not hoarse.      Firm, 7 mm, well-circumscribed lesion of the skin just lateral to the right oral commissure.  Overall appearance of the head and neck is normal.  Facial symmetry and movement are within normal limits bilaterally, and skin is warm and dry.    Extraocular movement is preserved.    Ears:  Normal pinnae, ear canals, and tympanic membranes bilaterally.  Normal motion of the TM (tympanic membrane) on pneumatic otoscopy.  No TMJ (temporomandibular joint) tenderness.  No mastoid process tenderness.  Speech reception within  normal limits.    Nose:  Normal septum, bridge, and nasal mucosa.  No sinus tenderness on percussion.    Oral cavity:  Normal lips, gums, floor of mouth, buccal mucosa, tongue, and hard palate.    Oropharynx:  Normal soft palate, posterior pharyngeal wall, tonsillar fossae and tonsils.    Neck:  No abnormal masses, thyroid masses or enlargement, lymphadenopathy, crepitus or tenderness.  Trachea midline.    Chest:  No respiratory distress, stridor, or wheezing.    Cardiovascular:  No peripheral edema, normal peripheral perfusion, no pallor.    Neurologic:  Cranial nerve function grossly within normal limits.      LABORATORY DATA:  Lab Results   Component Value Date    WBC 6.9 05/08/2015    HCT 44.1 05/08/2015    HGB 14.2 05/08/2015     05/08/2015     INR (no units)   Date Value   03/15/2013 2.1     INR-POC (no units)   Date Value   12/14/2018 3.1 (A)   10/26/2018 2.1       IMAGING STUDIES:  none    PROCEDURE:  none    ASSESSMENT:  1. Lip lesion    2. Itchy skin      Discussed with patient expected it is likely a benign skin cyst or sebaceous cyst. With its location so close to the oral mucosa, also considered mucocele though I feel it's more superficial to the dermal surface. Discussed excision could be performed in the office, would involve local anesthesia, sutures that need to be removed at a later date.  Discussed risk of scarring or recurrence. Also discussed risk of losing or significant bruising given the patient is on Coumadin. Patient is concerned regarding this, consulted his primary and cardiologist regarding holding Coumadin and I believe it is medically advisable to recommend against holding Coumadin. I expect some bruising and bleeding, but this can be managed.    Regarding his skin complaints, patient wishes to get the opinion of her dermatologist, consult was entered      PLAN:  Orders Placed This Encounter   • SERVICE TO DERMATOLOGY     40 minute in office procedure for excision of skin lesion  may be scheduled      The patient indicated understanding of the diagnosis and agreed with the plan of care.

## 2024-06-10 ENCOUNTER — OFFICE VISIT (OUTPATIENT)
Dept: CARDIOLOGY CLINIC | Age: 59
End: 2024-06-10
Payer: MEDICARE

## 2024-06-10 VITALS
OXYGEN SATURATION: 98 % | BODY MASS INDEX: 42.19 KG/M2 | HEART RATE: 50 BPM | SYSTOLIC BLOOD PRESSURE: 118 MMHG | WEIGHT: 175 LBS | DIASTOLIC BLOOD PRESSURE: 2 MMHG

## 2024-06-10 DIAGNOSIS — R07.9 CHEST PAIN, UNSPECIFIED TYPE: ICD-10-CM

## 2024-06-10 DIAGNOSIS — R07.89 OTHER CHEST PAIN: Primary | ICD-10-CM

## 2024-06-10 PROCEDURE — G8427 DOCREV CUR MEDS BY ELIG CLIN: HCPCS | Performed by: INTERNAL MEDICINE

## 2024-06-10 PROCEDURE — 1111F DSCHRG MED/CURRENT MED MERGE: CPT | Performed by: INTERNAL MEDICINE

## 2024-06-10 PROCEDURE — 99203 OFFICE O/P NEW LOW 30 MIN: CPT | Performed by: INTERNAL MEDICINE

## 2024-06-10 PROCEDURE — 3017F COLORECTAL CA SCREEN DOC REV: CPT | Performed by: INTERNAL MEDICINE

## 2024-06-10 PROCEDURE — G8417 CALC BMI ABV UP PARAM F/U: HCPCS | Performed by: INTERNAL MEDICINE

## 2024-06-10 PROCEDURE — 1036F TOBACCO NON-USER: CPT | Performed by: INTERNAL MEDICINE

## 2024-06-10 NOTE — PATIENT INSTRUCTIONS
No medication changes  Continue risk factor modifications.   Call for any change in symptoms, call to report any changes in shortness of breath or development of chest pain with activity.    Follow up in 6 mos with limited echo

## 2024-07-25 ENCOUNTER — OFFICE VISIT (OUTPATIENT)
Dept: PULMONOLOGY | Age: 59
End: 2024-07-25
Payer: MEDICARE

## 2024-07-25 VITALS
HEIGHT: 55 IN | HEART RATE: 76 BPM | OXYGEN SATURATION: 97 % | WEIGHT: 175 LBS | DIASTOLIC BLOOD PRESSURE: 78 MMHG | SYSTOLIC BLOOD PRESSURE: 116 MMHG | BODY MASS INDEX: 40.5 KG/M2

## 2024-07-25 DIAGNOSIS — G47.33 OSA ON CPAP: Primary | ICD-10-CM

## 2024-07-25 DIAGNOSIS — E66.01 MORBID OBESITY WITH BMI OF 40.0-44.9, ADULT (HCC): ICD-10-CM

## 2024-07-25 DIAGNOSIS — I10 HYPERTENSION, UNSPECIFIED TYPE: ICD-10-CM

## 2024-07-25 PROCEDURE — G2211 COMPLEX E/M VISIT ADD ON: HCPCS | Performed by: STUDENT IN AN ORGANIZED HEALTH CARE EDUCATION/TRAINING PROGRAM

## 2024-07-25 PROCEDURE — 99204 OFFICE O/P NEW MOD 45 MIN: CPT | Performed by: STUDENT IN AN ORGANIZED HEALTH CARE EDUCATION/TRAINING PROGRAM

## 2024-07-25 PROCEDURE — 3017F COLORECTAL CA SCREEN DOC REV: CPT | Performed by: STUDENT IN AN ORGANIZED HEALTH CARE EDUCATION/TRAINING PROGRAM

## 2024-07-25 PROCEDURE — 1036F TOBACCO NON-USER: CPT | Performed by: STUDENT IN AN ORGANIZED HEALTH CARE EDUCATION/TRAINING PROGRAM

## 2024-07-25 PROCEDURE — G8417 CALC BMI ABV UP PARAM F/U: HCPCS | Performed by: STUDENT IN AN ORGANIZED HEALTH CARE EDUCATION/TRAINING PROGRAM

## 2024-07-25 PROCEDURE — G8427 DOCREV CUR MEDS BY ELIG CLIN: HCPCS | Performed by: STUDENT IN AN ORGANIZED HEALTH CARE EDUCATION/TRAINING PROGRAM

## 2024-07-25 PROCEDURE — 3078F DIAST BP <80 MM HG: CPT | Performed by: STUDENT IN AN ORGANIZED HEALTH CARE EDUCATION/TRAINING PROGRAM

## 2024-07-25 PROCEDURE — 3074F SYST BP LT 130 MM HG: CPT | Performed by: STUDENT IN AN ORGANIZED HEALTH CARE EDUCATION/TRAINING PROGRAM

## 2024-07-25 NOTE — PATIENT INSTRUCTIONS
fields or contain ferromagnetic materials that attract/repel to magnetic fields (some metallic implants, e.g., contact lenses with metal, dental implants, metallic cranial plates, screws, diana hole covers, and bone substitute devices). Consult your physician and  of your implant / other medical device for information on the potential adverse effects of magnetic fields.  Note, not all models or variants of medical devices listed in the contraindications are affected by external magnetic fields. If you are not sure whether an implant or medical device falls under the contraindications, or you require additional information on the potential adverse effects of magnetic fields for your particular implant or medical device, please contact your physician/doctor.    Contact Information   Customers in the [place] with questions about this recall should contact [facility] at call [phone].

## 2024-08-27 ENCOUNTER — HOSPITAL ENCOUNTER (OUTPATIENT)
Dept: SLEEP CENTER | Age: 59
Discharge: HOME | End: 2024-08-27
Payer: MEDICARE

## 2024-08-27 DIAGNOSIS — I10 HYPERTENSION, UNSPECIFIED TYPE: ICD-10-CM

## 2024-08-27 DIAGNOSIS — E66.01 MORBID OBESITY WITH BMI OF 40.0-44.9, ADULT (HCC): ICD-10-CM

## 2024-08-27 DIAGNOSIS — G47.33 OSA ON CPAP: ICD-10-CM

## 2024-08-27 PROCEDURE — 95810 POLYSOM 6/> YRS 4/> PARAM: CPT

## 2024-08-28 ENCOUNTER — TELEPHONE (OUTPATIENT)
Dept: PULMONOLOGY | Age: 59
End: 2024-08-28

## 2024-08-28 NOTE — TELEPHONE ENCOUNTER
Patient's sleep study is negative for JOSE A. I will contact them to discuss results.     Ruel Schmidt MD

## 2024-08-28 NOTE — TELEPHONE ENCOUNTER
I attempted to reach patient's manager Yamile (patient lives at nursing home) by phone but unsuccessfully. I left a vm asking her to call us back and let us know what's the best time to reach them.    Ruel Schmidt MD

## 2024-08-29 PROBLEM — G47.33 OSA ON CPAP: Status: ACTIVE | Noted: 2024-08-29

## 2024-08-30 NOTE — TELEPHONE ENCOUNTER
I spoke to Faith, who is the nurse on-call at [facility] and reported test results.  I also shared that based on results, patient does not need to continue with PAP therapy.  Faiht endorsed understanding.  We will fax PSG reports to them (fax: [phone]).  No follow-up needed at this time.    Ruel Schmidt MD

## 2024-10-29 ENCOUNTER — APPOINTMENT (OUTPATIENT)
Dept: GENERAL RADIOLOGY | Age: 59
End: 2024-10-29
Payer: MEDICARE

## 2024-10-29 ENCOUNTER — HOSPITAL ENCOUNTER (EMERGENCY)
Age: 59
Discharge: HOME | End: 2024-10-29
Payer: MEDICARE

## 2024-10-29 ENCOUNTER — APPOINTMENT (OUTPATIENT)
Dept: CT IMAGING | Age: 59
End: 2024-10-29
Payer: MEDICARE

## 2024-10-29 VITALS
DIASTOLIC BLOOD PRESSURE: 81 MMHG | HEART RATE: 60 BPM | TEMPERATURE: 98 F | WEIGHT: 196 LBS | OXYGEN SATURATION: 100 % | BODY MASS INDEX: 47.26 KG/M2 | SYSTOLIC BLOOD PRESSURE: 155 MMHG | RESPIRATION RATE: 18 BRPM

## 2024-10-29 DIAGNOSIS — S89.92XA INJURY OF LEFT KNEE, INITIAL ENCOUNTER: Primary | ICD-10-CM

## 2024-10-29 PROBLEM — S82.022A: Status: ACTIVE | Noted: 2024-10-29

## 2024-10-29 PROCEDURE — 6370000000 HC RX 637 (ALT 250 FOR IP): Performed by: PHYSICIAN ASSISTANT

## 2024-10-29 PROCEDURE — 99284 EMERGENCY DEPT VISIT MOD MDM: CPT

## 2024-10-29 PROCEDURE — 73700 CT LOWER EXTREMITY W/O DYE: CPT

## 2024-10-29 PROCEDURE — 99284 EMERGENCY DEPT VISIT MOD MDM: CPT | Performed by: ORTHOPAEDIC SURGERY

## 2024-10-29 PROCEDURE — 73562 X-RAY EXAM OF KNEE 3: CPT

## 2024-10-29 RX ORDER — IBUPROFEN 800 MG/1
800 TABLET, FILM COATED ORAL ONCE
Status: COMPLETED | OUTPATIENT
Start: 2024-10-29 | End: 2024-10-29

## 2024-10-29 RX ADMIN — IBUPROFEN 800 MG: 800 TABLET, FILM COATED ORAL at 12:37

## 2024-10-29 ASSESSMENT — ENCOUNTER SYMPTOMS
RHINORRHEA: 0
SHORTNESS OF BREATH: 0
ABDOMINAL PAIN: 0
VOMITING: 0
WHEEZING: 0
NAUSEA: 0
DIARRHEA: 0
COUGH: 0

## 2024-10-29 ASSESSMENT — PAIN - FUNCTIONAL ASSESSMENT: PAIN_FUNCTIONAL_ASSESSMENT: 0-10

## 2024-10-29 ASSESSMENT — PAIN SCALES - GENERAL: PAINLEVEL_OUTOF10: 10

## 2024-10-29 NOTE — ED PROVIDER NOTES
[facility] EMERGENCY DEPARTMENT  EMERGENCY DEPARTMENT ENCOUNTER        Pt Name: Aster Jauregui  MRN: [id number]  Birthdate 1965  Date of evaluation: 10/29/2024  Provider: Nakia Thomas PA-C  PCP: No primary care provider on file.  Note Started: 1:28 PM EDT 10/29/24      EDDI. I have evaluated this patient.        CHIEF COMPLAINT       Chief Complaint   Patient presents with    Fall     Arrived by  EMS from [facility] rt fall yesterday; reported left knee pain & given Tylenol at 1100.         HISTORY OF PRESENT ILLNESS: 1 or more Elements     History From: patient   Limitations to history : None    Aster Jauregui is a 59 y.o. female who presents for evaluation of left knee injury.  Patient had a chemical fall yesterday.  States that she believes she ultimately landed on her bottom but is complaining of pain mainly in the left knee.  Took Tylenol for this with mild improvement.  No significant swelling or bruising.  No numbness tingling or weakness distally.  No back pain.  No saddle anesthesia, bladder retention or incontinence.  He denies hitting her head or any loss of consciousness.  No other injuries or complaints at this time.    Nursing Notes were all reviewed and agreed with or any disagreements were addressed in the HPI.    REVIEW OF SYSTEMS :      Review of Systems   Constitutional:  Negative for appetite change, chills and fever.   HENT:  Negative for congestion and rhinorrhea.    Respiratory:  Negative for cough, shortness of breath and wheezing.    Cardiovascular:  Negative for chest pain.   Gastrointestinal:  Negative for abdominal pain, diarrhea, nausea and vomiting.   Genitourinary:  Negative for difficulty urinating, dysuria and hematuria.   Musculoskeletal:  Positive for arthralgias (L knee), gait problem and joint swelling. Negative for neck pain and neck stiffness.   Skin:  Negative for rash.   Neurological:  Negative for weakness, numbness and headaches.

## 2024-10-29 NOTE — CONSULTS
[facility] Orthopedic Surgery  Consult Note         This patient is seen in consultation at the request of Nakia Thomas PA-C     Reason for Consult:  Left knee pain, patella minimally displaced longitudinal fracture.    CHIEF COMPLAINT:  Left knee pain, patella minimally displaced longitudinal fracture.    History Obtained From:  patient, electronic medical record    HISTORY OF PRESENT ILLNESS:   Ms. Jauregui 59 y.o.   female seen today at  ED for evaluation of a left knee pain which occurred when she fell 10/28/2024.   She is complaining of left knee pain.  This is better with rest and worse with bearing any wt.  The pain is sharp and not radiating. No other complaint. She was seen 1st at , where she was x-rayed and I was consulted.    Past Medical History:        Diagnosis Date    Allergic rhinitis     Alopecia     Anterolisthesis of lumbar spine     Anxiety     Asthma     Constipation     Depression     GERD (gastroesophageal reflux disease)     Hyperopia     Hypertension     Insomnia     Mild intellectual disability     Obstructive sleep apnea     Osteoarthritis of both knees     Osteopenia     Ovarian cyst     Overactive bladder     Periumbilical hernia     Presbyopia     Psychotic disorder (HCC)        Past Surgical History:    History reviewed. No pertinent surgical history.    Medications prior to admission:   Prior to Admission medications    Medication Sig Start Date End Date Taking? Authorizing Provider   losartan (COZAAR) 100 MG tablet Take 1 tablet by mouth daily    Kalie Weaver MD   mirtazapine (REMERON) 15 MG tablet Take 1 tablet by mouth nightly    Kalie Weaver MD   omeprazole (PRILOSEC) 20 MG delayed release capsule Take 1 capsule by mouth every other day    Kalie Weaver MD   oxyBUTYnin (DITROPAN-XL) 10 MG extended release tablet Take 2 tablets by mouth every other day    Kalie Weaver MD   fluticasone-umeclidin-vilant (TRELEGY

## 2024-10-30 ENCOUNTER — TELEPHONE (OUTPATIENT)
Dept: ORTHOPEDIC SURGERY | Age: 59
End: 2024-10-30

## 2024-10-30 NOTE — TELEPHONE ENCOUNTER
GUCCI the charge nurse at [facility] who gave me the phone number to the apt , Rl. Phone Number [phone]    Called the number supplied a received a VM. VM message did not state that it was a secure line. Left a message requesting a call back to schedule an apt for a mutual patient. \    Upon return call, please offer  11/1/24 at [place] in an open slot. If there are no available slot, please reach out to Griselda Cuevas on  Teams.

## 2024-11-05 ENCOUNTER — OFFICE VISIT (OUTPATIENT)
Dept: ORTHOPEDIC SURGERY | Age: 59
End: 2024-11-05
Payer: MEDICARE

## 2024-11-05 ENCOUNTER — TELEPHONE (OUTPATIENT)
Dept: ORTHOPEDIC SURGERY | Age: 59
End: 2024-11-05

## 2024-11-05 VITALS — BODY MASS INDEX: 45.36 KG/M2 | WEIGHT: 195.99 LBS | HEIGHT: 55 IN

## 2024-11-05 DIAGNOSIS — M25.562 ACUTE PAIN OF LEFT KNEE: Primary | ICD-10-CM

## 2024-11-05 DIAGNOSIS — S82.022A CLOSED DISPLACED LONGITUDINAL FRACTURE OF LEFT PATELLA, INITIAL ENCOUNTER: ICD-10-CM

## 2024-11-05 PROCEDURE — G8484 FLU IMMUNIZE NO ADMIN: HCPCS | Performed by: NURSE PRACTITIONER

## 2024-11-05 PROCEDURE — G8427 DOCREV CUR MEDS BY ELIG CLIN: HCPCS | Performed by: NURSE PRACTITIONER

## 2024-11-05 PROCEDURE — 99204 OFFICE O/P NEW MOD 45 MIN: CPT | Performed by: NURSE PRACTITIONER

## 2024-11-05 PROCEDURE — G8417 CALC BMI ABV UP PARAM F/U: HCPCS | Performed by: NURSE PRACTITIONER

## 2024-11-05 PROCEDURE — 3017F COLORECTAL CA SCREEN DOC REV: CPT | Performed by: NURSE PRACTITIONER

## 2024-11-05 PROCEDURE — 27520 TREAT KNEECAP FRACTURE: CPT | Performed by: NURSE PRACTITIONER

## 2024-11-05 PROCEDURE — 1036F TOBACCO NON-USER: CPT | Performed by: NURSE PRACTITIONER

## 2024-11-05 NOTE — PROGRESS NOTES
CHIEF COMPLAINT: Left knee pain, patella minimally displaced longitudinal fracture.    DATE OF INJURY:  10/28/2024, DOT: 11/5/2024    Ms. Jauregui 59 y.o.   female mentally challenged presents today for hospital follow up visit for evaluation of a left knee pain which occurred when she  fell. This is to check for increased displacement.   She is complaining of left knee pain.  This is better with rest and worse with bearing any wt.  The pain is sharp and not radiating. She has been in a knee immobilizer and has been unable to bear weight.  No other complaint. She was seen 1st at [facility], where she was x-rayed and splinted and asked to f/u with orthopaedics.Dr Medellin saw her inpatient and placed her in a knee immobilizer WB with 1 week follow up. The patient works at [employer] and lives in a F.     Past Medical History:   Diagnosis Date    Allergic rhinitis     Alopecia     Anterolisthesis of lumbar spine     Anxiety     Asthma     Constipation     Depression     GERD (gastroesophageal reflux disease)     Hyperopia     Hypertension     Insomnia     Mild intellectual disability     Obstructive sleep apnea     Osteoarthritis of both knees     Osteopenia     Ovarian cyst     Overactive bladder     Periumbilical hernia     Presbyopia     Psychotic disorder (HCC)      No past surgical history on file.  No family history on file.  Social History     Socioeconomic History    Marital status: Unknown     Spouse name: Not on file    Number of children: Not on file    Years of education: Not on file    Highest education level: Not on file   Occupational History    Not on file   Tobacco Use    Smoking status: Never     Passive exposure: Never    Smokeless tobacco: Never   Substance and Sexual Activity    Alcohol use: Never    Drug use: Never    Sexual activity: Never   Other Topics Concern    Not on file   Social History Narrative    Not on file     Social Determinants of Health

## 2024-12-11 ENCOUNTER — TELEPHONE (OUTPATIENT)
Dept: ORTHOPEDIC SURGERY | Age: 59
End: 2024-12-11

## 2024-12-11 NOTE — TELEPHONE ENCOUNTER
General Question     Subject: DISABILITY FORM  Patient and /or Facility Request: DIEGO PARRY  Contact Number: [phone]    DIEGO FROM [facility] CALLING REGARDING PATIENTS DISABILITY FORMS WANTING TO KNOW WHERE WAS THEY SENT SAYS PATIENT NEEDS THEM FORM TO GO BACK TO WORK. PLEASE CALL TY BACK AT THE ABOVE NUMBER.

## 2024-12-16 NOTE — PROGRESS NOTES
normal LAP. MV E/e' septal velocity is 18.67 .    Right Ventricle: Right ventricle size is normal. Normal systolic function. TAPSE is 2.4 cm. RV Peak S' is 10 cm/s.    Tricuspid Valve: Unable to assess RVSP.    Interatrial Septum: Agitated saline study was negative with and without provocation.    Pericardium: Small to moderate pericardial effusion present. No indication of cardiac tamponade. Evidence includes no IVC dilation and no chamber collapse.    IVC/SVC: IVC diameter is less than or equal to 21 mm and decreases greater than 50% during inspiration; therefore the estimated right atrial pressure is normal (~3 mmHg). IVC size is normal.      Assessment  Chest discomfort  Denies recurrent chest pain  No significant pericardial effusion  - Limited echo: Today (12/19/24) - Personally interpreted > Pericardial effusion is unchanged    - Will repeat limited echo in 1 year    Hypertension  /76 (Site: Right Upper Arm, Position: Sitting, Cuff Size: Large Adult)   Pulse 59   Ht 1.372 m (4' 6\")   Wt 86.6 kg (191 lb)   SpO2 97%   BMI 46.05 kg/m²   Continue on current medication regimen        Plan:    12/19/24  Cardiac test and lab results personally reviewed by me during this office visit and discussed.     No medication changes today   Continue risk factor modifications  Call for any change in symptoms, call to report any changes in shortness of breath, development of chest pain with activity or feelings of lightheadedness/dizziness.  Follow up in 1 year with limited echo        I appreciate the opportunity of cooperating in the care of this individual.    Russell Thompson M.D., [facility]    Patient's problem list, medications, allergies, past medical, surgical, social and family histories were reviewed and updated as appropriate.    Scribe's attestation: This note was scribed in the presence of Dr. Donna MD by Stephanie Gonsalez, RN.    The scribe's documentation has been prepared under my direction and personally

## 2024-12-17 ENCOUNTER — OFFICE VISIT (OUTPATIENT)
Dept: ORTHOPEDIC SURGERY | Age: 59
End: 2024-12-17

## 2024-12-17 VITALS — WEIGHT: 195.99 LBS | HEIGHT: 55 IN | BODY MASS INDEX: 45.36 KG/M2

## 2024-12-17 DIAGNOSIS — S82.022A CLOSED DISPLACED LONGITUDINAL FRACTURE OF LEFT PATELLA, INITIAL ENCOUNTER: ICD-10-CM

## 2024-12-17 DIAGNOSIS — M25.562 ACUTE PAIN OF LEFT KNEE: Primary | ICD-10-CM

## 2024-12-17 PROCEDURE — 99024 POSTOP FOLLOW-UP VISIT: CPT | Performed by: NURSE PRACTITIONER

## 2024-12-17 NOTE — PROGRESS NOTES
CHIEF COMPLAINT: Left knee pain, patella minimally displaced longitudinal fracture.    DATE OF INJURY:  10/28/2024, DOT: 11/5/2024    Ms. Jauregui 59 y.o.   female mentally challenged presents today for follow up visit for evaluation of a left knee pain which occurred when she fell.   She denies left knee pain and is doing much better. Rates pain a 0/10 VAS.  No other complaint. She was seen 1st at [facility], where she was x-rayed and splinted and asked to f/u with orthopaedics.Dr Medellin saw her inpatient and placed her in a knee immobilizer WB with 1 week follow up. The patient works at [employer] and lives in a ECF.     Past Medical History:   Diagnosis Date    Allergic rhinitis     Alopecia     Anterolisthesis of lumbar spine     Anxiety     Asthma     Constipation     Depression     GERD (gastroesophageal reflux disease)     Hyperopia     Hypertension     Insomnia     Mild intellectual disability     Obstructive sleep apnea     Osteoarthritis of both knees     Osteopenia     Ovarian cyst     Overactive bladder     Periumbilical hernia     Presbyopia     Psychotic disorder (HCC)      No past surgical history on file.  No family history on file.  Social History     Socioeconomic History    Marital status: Unknown     Spouse name: Not on file    Number of children: Not on file    Years of education: Not on file    Highest education level: Not on file   Occupational History    Not on file   Tobacco Use    Smoking status: Never     Passive exposure: Never    Smokeless tobacco: Never   Substance and Sexual Activity    Alcohol use: Never    Drug use: Never    Sexual activity: Never   Other Topics Concern    Not on file   Social History Narrative    Not on file     Social Determinants of Health     Financial Resource Strain: Not on file   Food Insecurity: No Food Insecurity (5/16/2024)    Hunger Vital Sign     Worried About Running Out of Food in the Last Year: Never true     Ran Out of

## 2024-12-19 ENCOUNTER — OFFICE VISIT (OUTPATIENT)
Dept: CARDIOLOGY CLINIC | Age: 59
End: 2024-12-19
Payer: MEDICARE

## 2024-12-19 VITALS
SYSTOLIC BLOOD PRESSURE: 126 MMHG | BODY MASS INDEX: 44.2 KG/M2 | DIASTOLIC BLOOD PRESSURE: 76 MMHG | HEART RATE: 59 BPM | WEIGHT: 191 LBS | OXYGEN SATURATION: 97 % | HEIGHT: 55 IN

## 2024-12-19 DIAGNOSIS — R07.9 CHEST PAIN, UNSPECIFIED TYPE: ICD-10-CM

## 2024-12-19 DIAGNOSIS — I10 HYPERTENSION, UNSPECIFIED TYPE: ICD-10-CM

## 2024-12-19 DIAGNOSIS — I31.39 PERICARDIAL EFFUSION: Primary | ICD-10-CM

## 2024-12-19 PROCEDURE — G8427 DOCREV CUR MEDS BY ELIG CLIN: HCPCS | Performed by: INTERNAL MEDICINE

## 2024-12-19 PROCEDURE — 99214 OFFICE O/P EST MOD 30 MIN: CPT | Performed by: INTERNAL MEDICINE

## 2024-12-19 PROCEDURE — 3017F COLORECTAL CA SCREEN DOC REV: CPT | Performed by: INTERNAL MEDICINE

## 2024-12-19 PROCEDURE — 1036F TOBACCO NON-USER: CPT | Performed by: INTERNAL MEDICINE

## 2024-12-19 PROCEDURE — G8484 FLU IMMUNIZE NO ADMIN: HCPCS | Performed by: INTERNAL MEDICINE

## 2024-12-19 PROCEDURE — G8417 CALC BMI ABV UP PARAM F/U: HCPCS | Performed by: INTERNAL MEDICINE

## 2024-12-19 PROCEDURE — 3078F DIAST BP <80 MM HG: CPT | Performed by: INTERNAL MEDICINE

## 2024-12-19 PROCEDURE — 3074F SYST BP LT 130 MM HG: CPT | Performed by: INTERNAL MEDICINE

## 2024-12-19 NOTE — PATIENT INSTRUCTIONS
No medication changes today     Call office if you develop shortness of breath or any chest pain/discomfort    Let someone know if you start to experience lightheadedness or dizziness.      Follow up with Dr. Thompson in 1 year with repeat limited echocardiogram same day

## 2024-12-31 ENCOUNTER — HOSPITAL ENCOUNTER (EMERGENCY)
Age: 59
Discharge: HOME | End: 2024-12-31
Payer: MEDICARE

## 2024-12-31 ENCOUNTER — APPOINTMENT (OUTPATIENT)
Dept: GENERAL RADIOLOGY | Age: 59
End: 2024-12-31
Payer: MEDICARE

## 2024-12-31 VITALS
DIASTOLIC BLOOD PRESSURE: 65 MMHG | BODY MASS INDEX: 44.2 KG/M2 | RESPIRATION RATE: 14 BRPM | WEIGHT: 191 LBS | HEART RATE: 62 BPM | TEMPERATURE: 98.6 F | HEIGHT: 55 IN | SYSTOLIC BLOOD PRESSURE: 127 MMHG | OXYGEN SATURATION: 95 %

## 2024-12-31 DIAGNOSIS — J18.9 PNEUMONIA OF RIGHT LOWER LOBE DUE TO INFECTIOUS ORGANISM: Primary | ICD-10-CM

## 2024-12-31 DIAGNOSIS — R42 DIZZINESS: ICD-10-CM

## 2024-12-31 DIAGNOSIS — R09.81 NASAL CONGESTION: ICD-10-CM

## 2024-12-31 LAB
ANION GAP SERPL CALCULATED.3IONS-SCNC: 9 MMOL/L (ref 3–16)
BASOPHILS # BLD: 0 K/UL (ref 0–0.2)
BASOPHILS NFR BLD: 0.6 %
BUN SERPL-MCNC: 11 MG/DL (ref 7–20)
CALCIUM SERPL-MCNC: 10 MG/DL (ref 8.3–10.6)
CHLORIDE SERPL-SCNC: 99 MMOL/L (ref 99–110)
CO2 SERPL-SCNC: 27 MMOL/L (ref 21–32)
CREAT SERPL-MCNC: 0.7 MG/DL (ref 0.6–1.1)
DEPRECATED RDW RBC AUTO: 13.3 % (ref 12.4–15.4)
EOSINOPHIL # BLD: 0.1 K/UL (ref 0–0.6)
EOSINOPHIL NFR BLD: 1.7 %
FLUAV RNA RESP QL NAA+PROBE: NOT DETECTED
FLUBV RNA RESP QL NAA+PROBE: NOT DETECTED
GFR SERPLBLD CREATININE-BSD FMLA CKD-EPI: >90 ML/MIN/{1.73_M2}
GLUCOSE SERPL-MCNC: 98 MG/DL (ref 70–99)
HCT VFR BLD AUTO: 37.7 % (ref 36–48)
HGB BLD-MCNC: 12.5 G/DL (ref 12–16)
LYMPHOCYTES # BLD: 1.4 K/UL (ref 1–5.1)
LYMPHOCYTES NFR BLD: 16.9 %
MCH RBC QN AUTO: 30.4 PG (ref 26–34)
MCHC RBC AUTO-ENTMCNC: 33.1 G/DL (ref 31–36)
MCV RBC AUTO: 91.8 FL (ref 80–100)
MONOCYTES # BLD: 0.7 K/UL (ref 0–1.3)
MONOCYTES NFR BLD: 9.1 %
NEUTROPHILS # BLD: 5.7 K/UL (ref 1.7–7.7)
NEUTROPHILS NFR BLD: 71.7 %
PLATELET # BLD AUTO: 273 K/UL (ref 135–450)
PMV BLD AUTO: 7.1 FL (ref 5–10.5)
POTASSIUM SERPL-SCNC: 4.2 MMOL/L (ref 3.5–5.1)
RBC # BLD AUTO: 4.11 M/UL (ref 4–5.2)
REASON FOR REJECTION: NORMAL
REJECTED TEST: NORMAL
SARS-COV-2 RNA RESP QL NAA+PROBE: NOT DETECTED
SODIUM SERPL-SCNC: 135 MMOL/L (ref 136–145)
WBC # BLD AUTO: 8 K/UL (ref 4–11)

## 2024-12-31 PROCEDURE — 93005 ELECTROCARDIOGRAM TRACING: CPT | Performed by: PHYSICIAN ASSISTANT

## 2024-12-31 PROCEDURE — 87636 SARSCOV2 & INF A&B AMP PRB: CPT

## 2024-12-31 PROCEDURE — 6370000000 HC RX 637 (ALT 250 FOR IP): Performed by: PHYSICIAN ASSISTANT

## 2024-12-31 PROCEDURE — 94761 N-INVAS EAR/PLS OXIMETRY MLT: CPT

## 2024-12-31 PROCEDURE — 94640 AIRWAY INHALATION TREATMENT: CPT

## 2024-12-31 PROCEDURE — 6360000002 HC RX W HCPCS: Performed by: PHYSICIAN ASSISTANT

## 2024-12-31 PROCEDURE — 96374 THER/PROPH/DIAG INJ IV PUSH: CPT

## 2024-12-31 PROCEDURE — 96375 TX/PRO/DX INJ NEW DRUG ADDON: CPT

## 2024-12-31 PROCEDURE — 71045 X-RAY EXAM CHEST 1 VIEW: CPT

## 2024-12-31 PROCEDURE — 80048 BASIC METABOLIC PNL TOTAL CA: CPT

## 2024-12-31 PROCEDURE — 85025 COMPLETE CBC W/AUTO DIFF WBC: CPT

## 2024-12-31 PROCEDURE — 2500000003 HC RX 250 WO HCPCS: Performed by: PHYSICIAN ASSISTANT

## 2024-12-31 PROCEDURE — 99285 EMERGENCY DEPT VISIT HI MDM: CPT

## 2024-12-31 RX ORDER — AZITHROMYCIN 250 MG/1
500 TABLET, FILM COATED ORAL ONCE
Status: COMPLETED | OUTPATIENT
Start: 2024-12-31 | End: 2024-12-31

## 2024-12-31 RX ORDER — IPRATROPIUM BROMIDE AND ALBUTEROL SULFATE 2.5; .5 MG/3ML; MG/3ML
1 SOLUTION RESPIRATORY (INHALATION) ONCE
Status: COMPLETED | OUTPATIENT
Start: 2024-12-31 | End: 2024-12-31

## 2024-12-31 RX ORDER — GUAIFENESIN 600 MG/1
600 TABLET, EXTENDED RELEASE ORAL 2 TIMES DAILY
Qty: 30 TABLET | Refills: 0 | Status: SHIPPED | OUTPATIENT
Start: 2024-12-31 | End: 2025-01-15

## 2024-12-31 RX ORDER — ALBUTEROL SULFATE 0.83 MG/ML
SOLUTION RESPIRATORY (INHALATION)
Status: DISCONTINUED
Start: 2024-12-31 | End: 2025-01-01 | Stop reason: HOSPADM

## 2024-12-31 RX ORDER — AZITHROMYCIN 250 MG/1
250 TABLET, FILM COATED ORAL DAILY
Qty: 4 TABLET | Refills: 0 | Status: SHIPPED | OUTPATIENT
Start: 2024-12-31 | End: 2025-01-04

## 2024-12-31 RX ORDER — AMOXICILLIN 500 MG/1
1000 CAPSULE ORAL 3 TIMES DAILY
Qty: 42 CAPSULE | Refills: 0 | Status: SHIPPED | OUTPATIENT
Start: 2024-12-31 | End: 2025-01-07

## 2024-12-31 RX ORDER — GUAIFENESIN 200 MG/10ML
200 LIQUID ORAL ONCE
Status: COMPLETED | OUTPATIENT
Start: 2024-12-31 | End: 2024-12-31

## 2024-12-31 RX ADMIN — WATER 1000 MG: 1 INJECTION INTRAMUSCULAR; INTRAVENOUS; SUBCUTANEOUS at 21:55

## 2024-12-31 RX ADMIN — IPRATROPIUM BROMIDE AND ALBUTEROL SULFATE 1 DOSE: .5; 3 SOLUTION RESPIRATORY (INHALATION) at 19:30

## 2024-12-31 RX ADMIN — WATER 125 MG: 1 INJECTION INTRAMUSCULAR; INTRAVENOUS; SUBCUTANEOUS at 19:02

## 2024-12-31 RX ADMIN — GUAIFENESIN 200 MG: 100 SOLUTION ORAL at 22:05

## 2024-12-31 RX ADMIN — AZITHROMYCIN DIHYDRATE 500 MG: 250 TABLET ORAL at 21:55

## 2024-12-31 ASSESSMENT — ENCOUNTER SYMPTOMS
EYE DISCHARGE: 0
BACK PAIN: 0
COUGH: 1
RHINORRHEA: 1
STRIDOR: 0
EYE ITCHING: 0
DIARRHEA: 0
VOMITING: 0
ABDOMINAL PAIN: 0
SHORTNESS OF BREATH: 0
SORE THROAT: 0
EYE REDNESS: 0
NAUSEA: 0

## 2024-12-31 NOTE — ED PROVIDER NOTES
Genitourinary:  Negative for dysuria and hematuria.   Musculoskeletal:  Negative for back pain and joint swelling.   Skin:  Negative for rash.   Neurological:  Positive for dizziness. Negative for syncope, speech difficulty and headaches.       Positives and Pertinent negatives as per HPI.     SURGICAL HISTORY   No past surgical history on file.    CURRENTMEDICATIONS       Discharge Medication List as of 12/31/2024 10:01 PM        CONTINUE these medications which have NOT CHANGED    Details   losartan (COZAAR) 100 MG tablet Take 1 tablet by mouth dailyHistorical Med      mirtazapine (REMERON) 15 MG tablet Take 1 tablet by mouth nightlyHistorical Med      omeprazole (PRILOSEC) 20 MG delayed release capsule Take 1 capsule by mouth every other dayHistorical Med      oxyBUTYnin (DITROPAN-XL) 10 MG extended release tablet Take 2 tablets by mouth every other dayHistorical Med      fluticasone-umeclidin-vilant (TRELEGY ELLIPTA) 200-62.5-25 MCG/ACT AEPB inhaler Inhale 1 puff into the lungs dailyHistorical Med      acetaminophen (TYLENOL) 325 MG tablet Take 2 tablets by mouth every 4 hours as needed for PainHistorical Med      diclofenac (VOLTAREN) 75 MG EC tablet Take 1 tablet by mouth 2 times daily as needed (ARTHRITIS)Historical Med      nitroGLYCERIN (NITROSTAT) 0.4 MG SL tablet Place 1 tablet under the tongue every 5 minutes as needed for Chest pain up to max of 3 total doses. If no relief after 1 dose, call 911.Historical Med      ondansetron (ZOFRAN-ODT) 4 MG disintegrating tablet Take 1 tablet by mouth every 4 hours as needed for Nausea or VomitingHistorical Med      albuterol sulfate HFA (VENTOLIN HFA) 108 (90 Base) MCG/ACT inhaler Inhale 2 puffs into the lungs every 6 hours as needed for Wheezing or Shortness of BreathHistorical Med      Misc. Devices (WALKER) MISC DAILY Starting Tue 3/5/2024, Disp-1 each, R-0, Print      aspirin 81 MG chewable tablet Take 1 tablet by mouth nightlyHistorical Med      DULoxetine

## 2025-01-01 LAB
EKG ATRIAL RATE: 68 BPM
EKG DIAGNOSIS: NORMAL
EKG P AXIS: 55 DEGREES
EKG P-R INTERVAL: 132 MS
EKG Q-T INTERVAL: 438 MS
EKG QRS DURATION: 88 MS
EKG QTC CALCULATION (BAZETT): 465 MS
EKG R AXIS: 107 DEGREES
EKG T AXIS: 56 DEGREES
EKG VENTRICULAR RATE: 68 BPM

## 2025-01-01 PROCEDURE — 93010 ELECTROCARDIOGRAM REPORT: CPT | Performed by: INTERNAL MEDICINE

## 2025-01-01 NOTE — DISCHARGE INSTRUCTIONS
Take medication as prescribed.  Take full course of antibiotic.  Follow-up closely with PCP.  Do not hesitate to return to the emergency department if condition worsens, or Aster develops new symptoms such as difficulty breathing, focal weakness or confusion.

## 2025-01-16 ENCOUNTER — HOSPITAL ENCOUNTER (INPATIENT)
Age: 60
LOS: 4 days | Discharge: HOME | DRG: 194 | End: 2025-01-20
Attending: INTERNAL MEDICINE | Admitting: INTERNAL MEDICINE
Payer: MEDICARE

## 2025-01-16 ENCOUNTER — APPOINTMENT (OUTPATIENT)
Dept: CT IMAGING | Age: 60
DRG: 194 | End: 2025-01-16
Payer: MEDICARE

## 2025-01-16 DIAGNOSIS — J18.9 PNEUMONIA OF LEFT LOWER LOBE DUE TO INFECTIOUS ORGANISM: Primary | ICD-10-CM

## 2025-01-16 DIAGNOSIS — J45.901 EXACERBATION OF ASTHMA, UNSPECIFIED ASTHMA SEVERITY, UNSPECIFIED WHETHER PERSISTENT: ICD-10-CM

## 2025-01-16 DIAGNOSIS — I31.39 PERICARDIAL EFFUSION: ICD-10-CM

## 2025-01-16 PROBLEM — K21.9 GERD (GASTROESOPHAGEAL REFLUX DISEASE): Status: ACTIVE | Noted: 2025-01-16

## 2025-01-16 PROBLEM — J45.909 MILD ASTHMA: Status: ACTIVE | Noted: 2025-01-16

## 2025-01-16 PROBLEM — I10 HTN (HYPERTENSION): Status: ACTIVE | Noted: 2025-01-16

## 2025-01-16 LAB
ALBUMIN SERPL-MCNC: 4.1 G/DL (ref 3.4–5)
ALBUMIN/GLOB SERPL: 1.1 {RATIO} (ref 1.1–2.2)
ALP SERPL-CCNC: 93 U/L (ref 40–129)
ALT SERPL-CCNC: 21 U/L (ref 10–40)
ANION GAP SERPL CALCULATED.3IONS-SCNC: 8 MMOL/L (ref 3–16)
AST SERPL-CCNC: 24 U/L (ref 15–37)
BASOPHILS # BLD: 0 K/UL (ref 0–0.2)
BASOPHILS NFR BLD: 0.6 %
BILIRUB SERPL-MCNC: 0.4 MG/DL (ref 0–1)
BUN SERPL-MCNC: 14 MG/DL (ref 7–20)
CALCIUM SERPL-MCNC: 10.1 MG/DL (ref 8.3–10.6)
CHLORIDE SERPL-SCNC: 100 MMOL/L (ref 99–110)
CO2 SERPL-SCNC: 30 MMOL/L (ref 21–32)
CREAT SERPL-MCNC: 0.9 MG/DL (ref 0.6–1.1)
DEPRECATED RDW RBC AUTO: 13.6 % (ref 12.4–15.4)
EOSINOPHIL # BLD: 0.1 K/UL (ref 0–0.6)
EOSINOPHIL NFR BLD: 1.4 %
FLUAV RNA RESP QL NAA+PROBE: NOT DETECTED
FLUBV RNA RESP QL NAA+PROBE: NOT DETECTED
GFR SERPLBLD CREATININE-BSD FMLA CKD-EPI: 73 ML/MIN/{1.73_M2}
GLUCOSE SERPL-MCNC: 91 MG/DL (ref 70–99)
HCT VFR BLD AUTO: 37.8 % (ref 36–48)
HGB BLD-MCNC: 12.6 G/DL (ref 12–16)
LACTATE BLDV-SCNC: 0.8 MMOL/L (ref 0.4–1.9)
LYMPHOCYTES # BLD: 1.1 K/UL (ref 1–5.1)
LYMPHOCYTES NFR BLD: 17.1 %
MCH RBC QN AUTO: 30.4 PG (ref 26–34)
MCHC RBC AUTO-ENTMCNC: 33.2 G/DL (ref 31–36)
MCV RBC AUTO: 91.6 FL (ref 80–100)
MONOCYTES # BLD: 0.6 K/UL (ref 0–1.3)
MONOCYTES NFR BLD: 9.1 %
NEUTROPHILS # BLD: 4.8 K/UL (ref 1.7–7.7)
NEUTROPHILS NFR BLD: 71.8 %
NT-PROBNP SERPL-MCNC: 38 PG/ML (ref 0–124)
PLATELET # BLD AUTO: 289 K/UL (ref 135–450)
PMV BLD AUTO: 6.8 FL (ref 5–10.5)
POTASSIUM SERPL-SCNC: 4.1 MMOL/L (ref 3.5–5.1)
PROCALCITONIN SERPL IA-MCNC: 0.08 NG/ML (ref 0–0.15)
PROT SERPL-MCNC: 7.7 G/DL (ref 6.4–8.2)
RBC # BLD AUTO: 4.13 M/UL (ref 4–5.2)
SARS-COV-2 RNA RESP QL NAA+PROBE: NOT DETECTED
SODIUM SERPL-SCNC: 138 MMOL/L (ref 136–145)
TROPONIN, HIGH SENSITIVITY: 8 NG/L (ref 0–14)
TROPONIN, HIGH SENSITIVITY: 9 NG/L (ref 0–14)
WBC # BLD AUTO: 6.7 K/UL (ref 4–11)

## 2025-01-16 PROCEDURE — 85025 COMPLETE CBC W/AUTO DIFF WBC: CPT

## 2025-01-16 PROCEDURE — 6360000002 HC RX W HCPCS: Performed by: INTERNAL MEDICINE

## 2025-01-16 PROCEDURE — 2500000003 HC RX 250 WO HCPCS: Performed by: INTERNAL MEDICINE

## 2025-01-16 PROCEDURE — 83605 ASSAY OF LACTIC ACID: CPT

## 2025-01-16 PROCEDURE — 96374 THER/PROPH/DIAG INJ IV PUSH: CPT

## 2025-01-16 PROCEDURE — 84484 ASSAY OF TROPONIN QUANT: CPT

## 2025-01-16 PROCEDURE — 2580000003 HC RX 258: Performed by: PHYSICIAN ASSISTANT

## 2025-01-16 PROCEDURE — 6360000004 HC RX CONTRAST MEDICATION: Performed by: PHYSICIAN ASSISTANT

## 2025-01-16 PROCEDURE — 80053 COMPREHEN METABOLIC PANEL: CPT

## 2025-01-16 PROCEDURE — 1200000000 HC SEMI PRIVATE

## 2025-01-16 PROCEDURE — 71260 CT THORAX DX C+: CPT

## 2025-01-16 PROCEDURE — 6360000002 HC RX W HCPCS: Performed by: PHYSICIAN ASSISTANT

## 2025-01-16 PROCEDURE — 84145 PROCALCITONIN (PCT): CPT

## 2025-01-16 PROCEDURE — 99285 EMERGENCY DEPT VISIT HI MDM: CPT

## 2025-01-16 PROCEDURE — 87636 SARSCOV2 & INF A&B AMP PRB: CPT

## 2025-01-16 PROCEDURE — 6370000000 HC RX 637 (ALT 250 FOR IP): Performed by: INTERNAL MEDICINE

## 2025-01-16 PROCEDURE — 93005 ELECTROCARDIOGRAM TRACING: CPT | Performed by: INTERNAL MEDICINE

## 2025-01-16 PROCEDURE — 94761 N-INVAS EAR/PLS OXIMETRY MLT: CPT

## 2025-01-16 PROCEDURE — 83880 ASSAY OF NATRIURETIC PEPTIDE: CPT

## 2025-01-16 PROCEDURE — 6370000000 HC RX 637 (ALT 250 FOR IP): Performed by: PHYSICIAN ASSISTANT

## 2025-01-16 PROCEDURE — 94640 AIRWAY INHALATION TREATMENT: CPT

## 2025-01-16 PROCEDURE — 87040 BLOOD CULTURE FOR BACTERIA: CPT

## 2025-01-16 RX ORDER — SODIUM CHLORIDE 0.9 % (FLUSH) 0.9 %
5-40 SYRINGE (ML) INJECTION EVERY 12 HOURS SCHEDULED
Status: DISCONTINUED | OUTPATIENT
Start: 2025-01-16 | End: 2025-01-20 | Stop reason: HOSPADM

## 2025-01-16 RX ORDER — PANTOPRAZOLE SODIUM 40 MG/1
40 TABLET, DELAYED RELEASE ORAL
Status: DISCONTINUED | OUTPATIENT
Start: 2025-01-17 | End: 2025-01-20 | Stop reason: HOSPADM

## 2025-01-16 RX ORDER — IOPAMIDOL 755 MG/ML
75 INJECTION, SOLUTION INTRAVASCULAR
Status: COMPLETED | OUTPATIENT
Start: 2025-01-16 | End: 2025-01-16

## 2025-01-16 RX ORDER — SENNOSIDES A AND B 8.6 MG/1
8.6 TABLET, FILM COATED ORAL DAILY
Status: DISCONTINUED | OUTPATIENT
Start: 2025-01-17 | End: 2025-01-20 | Stop reason: HOSPADM

## 2025-01-16 RX ORDER — MIRTAZAPINE 15 MG/1
15 TABLET, FILM COATED ORAL NIGHTLY
Status: DISCONTINUED | OUTPATIENT
Start: 2025-01-16 | End: 2025-01-20 | Stop reason: HOSPADM

## 2025-01-16 RX ORDER — LOSARTAN POTASSIUM 25 MG/1
100 TABLET ORAL DAILY
Status: DISCONTINUED | OUTPATIENT
Start: 2025-01-17 | End: 2025-01-20 | Stop reason: HOSPADM

## 2025-01-16 RX ORDER — DEXAMETHASONE SODIUM PHOSPHATE 10 MG/ML
6 INJECTION, SOLUTION INTRAMUSCULAR; INTRAVENOUS ONCE
Status: DISCONTINUED | OUTPATIENT
Start: 2025-01-16 | End: 2025-01-16

## 2025-01-16 RX ORDER — DULOXETIN HYDROCHLORIDE 60 MG/1
60 CAPSULE, DELAYED RELEASE ORAL 2 TIMES DAILY
Status: DISCONTINUED | OUTPATIENT
Start: 2025-01-16 | End: 2025-01-20 | Stop reason: HOSPADM

## 2025-01-16 RX ORDER — ASPIRIN 81 MG/1
81 TABLET, CHEWABLE ORAL NIGHTLY
Status: DISCONTINUED | OUTPATIENT
Start: 2025-01-16 | End: 2025-01-20 | Stop reason: HOSPADM

## 2025-01-16 RX ORDER — OXYBUTYNIN CHLORIDE 5 MG/1
20 TABLET, EXTENDED RELEASE ORAL EVERY OTHER DAY
Status: DISCONTINUED | OUTPATIENT
Start: 2025-01-18 | End: 2025-01-20 | Stop reason: HOSPADM

## 2025-01-16 RX ORDER — ACETAMINOPHEN 325 MG/1
650 TABLET ORAL EVERY 6 HOURS PRN
Status: DISCONTINUED | OUTPATIENT
Start: 2025-01-16 | End: 2025-01-20 | Stop reason: HOSPADM

## 2025-01-16 RX ORDER — POLYETHYLENE GLYCOL 3350 17 G/17G
17 POWDER, FOR SOLUTION ORAL DAILY PRN
Status: DISCONTINUED | OUTPATIENT
Start: 2025-01-16 | End: 2025-01-20 | Stop reason: HOSPADM

## 2025-01-16 RX ORDER — ZOLPIDEM TARTRATE 5 MG/1
10 TABLET ORAL NIGHTLY
Status: DISCONTINUED | OUTPATIENT
Start: 2025-01-16 | End: 2025-01-20 | Stop reason: HOSPADM

## 2025-01-16 RX ORDER — DOCUSATE SODIUM 100 MG/1
100 CAPSULE, LIQUID FILLED ORAL DAILY
Status: DISCONTINUED | OUTPATIENT
Start: 2025-01-17 | End: 2025-01-20 | Stop reason: HOSPADM

## 2025-01-16 RX ORDER — LEVOFLOXACIN 5 MG/ML
750 INJECTION, SOLUTION INTRAVENOUS EVERY 24 HOURS
Status: DISCONTINUED | OUTPATIENT
Start: 2025-01-16 | End: 2025-01-20 | Stop reason: HOSPADM

## 2025-01-16 RX ORDER — ONDANSETRON 2 MG/ML
4 INJECTION INTRAMUSCULAR; INTRAVENOUS EVERY 6 HOURS PRN
Status: DISCONTINUED | OUTPATIENT
Start: 2025-01-16 | End: 2025-01-20 | Stop reason: HOSPADM

## 2025-01-16 RX ORDER — SODIUM CHLORIDE 9 MG/ML
INJECTION, SOLUTION INTRAVENOUS PRN
Status: DISCONTINUED | OUTPATIENT
Start: 2025-01-16 | End: 2025-01-20 | Stop reason: HOSPADM

## 2025-01-16 RX ORDER — ACETAMINOPHEN 650 MG/1
650 SUPPOSITORY RECTAL EVERY 6 HOURS PRN
Status: DISCONTINUED | OUTPATIENT
Start: 2025-01-16 | End: 2025-01-20 | Stop reason: HOSPADM

## 2025-01-16 RX ORDER — ALBUTEROL SULFATE 0.83 MG/ML
2.5 SOLUTION RESPIRATORY (INHALATION)
Status: DISCONTINUED | OUTPATIENT
Start: 2025-01-16 | End: 2025-01-20 | Stop reason: HOSPADM

## 2025-01-16 RX ORDER — TRAMADOL HYDROCHLORIDE 50 MG/1
50 TABLET ORAL EVERY 6 HOURS PRN
COMMUNITY

## 2025-01-16 RX ORDER — 0.9 % SODIUM CHLORIDE 0.9 %
1000 INTRAVENOUS SOLUTION INTRAVENOUS ONCE
Status: DISCONTINUED | OUTPATIENT
Start: 2025-01-16 | End: 2025-01-16

## 2025-01-16 RX ORDER — GUAIFENESIN/DEXTROMETHORPHAN 100-10MG/5
10 SYRUP ORAL EVERY 4 HOURS PRN
Status: DISCONTINUED | OUTPATIENT
Start: 2025-01-16 | End: 2025-01-20 | Stop reason: HOSPADM

## 2025-01-16 RX ORDER — ENOXAPARIN SODIUM 100 MG/ML
40 INJECTION SUBCUTANEOUS DAILY
Status: DISCONTINUED | OUTPATIENT
Start: 2025-01-16 | End: 2025-01-20 | Stop reason: HOSPADM

## 2025-01-16 RX ORDER — ALBUTEROL SULFATE 90 UG/1
2 INHALANT RESPIRATORY (INHALATION) EVERY 4 HOURS PRN
Status: DISCONTINUED | OUTPATIENT
Start: 2025-01-16 | End: 2025-01-16

## 2025-01-16 RX ORDER — METHOCARBAMOL 750 MG/1
750 TABLET, FILM COATED ORAL EVERY 8 HOURS PRN
COMMUNITY

## 2025-01-16 RX ORDER — BUDESONIDE AND FORMOTEROL FUMARATE DIHYDRATE 80; 4.5 UG/1; UG/1
2 AEROSOL RESPIRATORY (INHALATION)
Status: DISCONTINUED | OUTPATIENT
Start: 2025-01-16 | End: 2025-01-20 | Stop reason: HOSPADM

## 2025-01-16 RX ORDER — SODIUM CHLORIDE 0.9 % (FLUSH) 0.9 %
5-40 SYRINGE (ML) INJECTION PRN
Status: DISCONTINUED | OUTPATIENT
Start: 2025-01-16 | End: 2025-01-20 | Stop reason: HOSPADM

## 2025-01-16 RX ORDER — ONDANSETRON 4 MG/1
4 TABLET, ORALLY DISINTEGRATING ORAL EVERY 8 HOURS PRN
Status: DISCONTINUED | OUTPATIENT
Start: 2025-01-16 | End: 2025-01-20 | Stop reason: HOSPADM

## 2025-01-16 RX ORDER — IPRATROPIUM BROMIDE AND ALBUTEROL SULFATE 2.5; .5 MG/3ML; MG/3ML
1 SOLUTION RESPIRATORY (INHALATION) ONCE
Status: DISCONTINUED | OUTPATIENT
Start: 2025-01-16 | End: 2025-01-16

## 2025-01-16 RX ORDER — RISPERIDONE 1 MG/1
1 TABLET ORAL EVERY MORNING
Status: DISCONTINUED | OUTPATIENT
Start: 2025-01-17 | End: 2025-01-20 | Stop reason: HOSPADM

## 2025-01-16 RX ORDER — RISPERIDONE 1 MG/1
2 TABLET ORAL NIGHTLY
Status: DISCONTINUED | OUTPATIENT
Start: 2025-01-16 | End: 2025-01-20 | Stop reason: HOSPADM

## 2025-01-16 RX ORDER — ALBUTEROL SULFATE 0.83 MG/ML
2.5 SOLUTION RESPIRATORY (INHALATION) ONCE
Status: DISCONTINUED | OUTPATIENT
Start: 2025-01-16 | End: 2025-01-16

## 2025-01-16 RX ADMIN — DEXAMETHASONE SODIUM PHOSPHATE 6 MG: 10 INJECTION INTRAMUSCULAR; INTRAVENOUS at 16:08

## 2025-01-16 RX ADMIN — SODIUM CHLORIDE, PRESERVATIVE FREE 10 ML: 5 INJECTION INTRAVENOUS at 23:14

## 2025-01-16 RX ADMIN — SODIUM CHLORIDE 1000 ML: 9 INJECTION, SOLUTION INTRAVENOUS at 15:34

## 2025-01-16 RX ADMIN — ZOLPIDEM TARTRATE 10 MG: 5 TABLET ORAL at 23:10

## 2025-01-16 RX ADMIN — ASPIRIN 81 MG: 81 TABLET, CHEWABLE ORAL at 23:09

## 2025-01-16 RX ADMIN — RISPERIDONE 2 MG: 1 TABLET, FILM COATED ORAL at 23:10

## 2025-01-16 RX ADMIN — MIRTAZAPINE 15 MG: 15 TABLET, FILM COATED ORAL at 23:10

## 2025-01-16 RX ADMIN — LEVOFLOXACIN 750 MG: 750 INJECTION, SOLUTION INTRAVENOUS at 18:19

## 2025-01-16 RX ADMIN — DULOXETINE HYDROCHLORIDE 60 MG: 60 CAPSULE, DELAYED RELEASE ORAL at 23:10

## 2025-01-16 RX ADMIN — GUAIFENESIN AND DEXTROMETHORPHAN 10 ML: 100; 10 SYRUP ORAL at 23:09

## 2025-01-16 RX ADMIN — BUDESONIDE AND FORMOTEROL FUMARATE DIHYDRATE 2 PUFF: 80; 4.5 AEROSOL RESPIRATORY (INHALATION) at 22:30

## 2025-01-16 RX ADMIN — IPRATROPIUM BROMIDE AND ALBUTEROL SULFATE 1 DOSE: .5; 3 SOLUTION RESPIRATORY (INHALATION) at 15:09

## 2025-01-16 RX ADMIN — ALBUTEROL SULFATE 2.5 MG: 2.5 SOLUTION RESPIRATORY (INHALATION) at 15:09

## 2025-01-16 RX ADMIN — TIOTROPIUM BROMIDE INHALATION SPRAY 2 PUFF: 3.12 SPRAY, METERED RESPIRATORY (INHALATION) at 22:32

## 2025-01-16 RX ADMIN — IOPAMIDOL 75 ML: 755 INJECTION, SOLUTION INTRAVENOUS at 15:58

## 2025-01-16 RX ADMIN — ALBUTEROL SULFATE 2.5 MG: 2.5 SOLUTION RESPIRATORY (INHALATION) at 22:22

## 2025-01-16 ASSESSMENT — PAIN - FUNCTIONAL ASSESSMENT: PAIN_FUNCTIONAL_ASSESSMENT: NONE - DENIES PAIN

## 2025-01-16 NOTE — H&P
Hospital Medicine History & Physical      PCP: No primary care provider on file.    Date of Admission: 1/16/2025    Date of Service: Pt seen/examined on 1/16/2025 and Admitted to Inpatient with expected LOS greater than two midnights due to medical therapy.     Chief Complaint:    Chief Complaint   Patient presents with    Pneumonia     Came by  EMS from [facility], [place] with c/o pneumonia and worsening cough.          History Of Present Illness:    The patient is a 59 y.o. female with history of GERD, asthma, anxiety, sleep apnea with obesity, intellectual disability, hypertension who presented from [facility] and presented with persistent cough with associated shortness of breath in the setting of History of Pseudomonas MDRO Pseudomonas infection.   No fevers or chills.  No shortness of breath.  CT pulmonary angiogram negative for PE but noted for left lower lobe airspace disease with moderate pericardial effusion.    Past Medical History:        Diagnosis Date    Allergic rhinitis     Alopecia     Anterolisthesis of lumbar spine     Anxiety     Asthma     Constipation     Depression     GERD (gastroesophageal reflux disease)     Hyperopia     Hypertension     Insomnia     Mild intellectual disability     Obstructive sleep apnea     Osteoarthritis of both knees     Osteopenia     Ovarian cyst     Overactive bladder     Periumbilical hernia     Presbyopia     Psychotic disorder (HCC)        Past Surgical History:    History reviewed. No pertinent surgical history.    Medications Prior to Admission:    Prior to Admission medications    Medication Sig Start Date End Date Taking? Authorizing Provider   losartan (COZAAR) 100 MG tablet Take 1 tablet by mouth daily    Kalie Weaver MD   mirtazapine (REMERON) 15 MG tablet Take 1 tablet by mouth nightly    Kalie Weaver MD   omeprazole (PRILOSEC) 20 MG delayed release capsule Take 1 capsule by mouth every other day    Kalie Weaver MD

## 2025-01-16 NOTE — ED PROVIDER NOTES
Bowel sounds are normal. There is no distension.      Palpations: Abdomen is soft.      Tenderness: There is no abdominal tenderness. There is no guarding.   Musculoskeletal:         General: Normal range of motion.      Cervical back: Normal range of motion and neck supple.   Skin:     General: Skin is warm and dry.   Neurological:      Mental Status: She is alert and oriented to person, place, and time.   Psychiatric:         Behavior: Behavior normal.             DIAGNOSTIC RESULTS   LABS:    Labs Reviewed   COVID-19 & INFLUENZA COMBO   CULTURE, BLOOD 2   CULTURE, BLOOD 1   CBC WITH AUTO DIFFERENTIAL   COMPREHENSIVE METABOLIC PANEL W/ REFLEX TO MG FOR LOW K   TROPONIN   TROPONIN   BRAIN NATRIURETIC PEPTIDE   PROCALCITONIN   LACTATE, SEPSIS   LACTATE, SEPSIS       When ordered only abnormal lab results are displayed. All other labs were within normal range or not returned as of this dictation.    EKG: When ordered, EKG's are interpreted by the Emergency Department Physician in the absence of a cardiologist.  Please see their note for interpretation of EKG.    RADIOLOGY:   Non-plain film images such as CT, Ultrasound and MRI are read by the radiologist. Plain radiographic images are visualized and preliminarily interpreted by the ED Provider with the below findings:        Interpretation per the Radiologist below, if available at the time of this note:    CT CHEST PULMONARY EMBOLISM W CONTRAST   Final Result   1. No evidence of pulmonary embolism.   2. Moderate pericardial effusion.   3. Left lower lobe airspace disease/atelectasis.   4. Multiple small thyroid lesions bilaterally.  Recommend follow-up   nonemergent thyroid ultrasound.           CT CHEST PULMONARY EMBOLISM W CONTRAST    Result Date: 1/16/2025  EXAMINATION: CTA OF THE CHEST 1/16/2025 4:05 pm TECHNIQUE: CTA of the chest was performed after the administration of intravenous contrast.  Multiplanar reformatted images are provided for review.  MIP images  presents to the emergency department today for evaluation for concerns of pneumonia.  Patient reports that she has had cough, and shortness of breath and this has been ongoing for the past 2 weeks.  She reports that she has been on multiple antibiotics but continues to have a cough.    On examination she has diminished aeration throughout lung fields with scattered wheezing she does have a history of asthma  Disposition Considerations (tests considered but not done, Admit vs D/C, Shared Decision Making, Pt Expectation of Test or Tx.):    .  EKG will be documented by my attending, please see his note for further detail.    CBC shows no evidence of leukocytosis or anemia.  CMP is unremarkable.  Troponin is negative x 2.  BNP is normal.  Pro-Alin and lactic acid are normal.    CT of chest shows no evidence of PE she does have a moderate pericardial effusion.  She has a left lower lobe airspace disease    .  Although the patient's lab work is normal, no signs of sepsis, she does have a history of MDRO, Pseudomonas, therefore will cover with Levaquin.  She did feel outpatient treatment and I feel that she would benefit from admission and IV antibiotics, hospitalist has been paged for admission, patient is updated, agreeable with plan, stable for admission    I am the Primary Clinician of Record.  FINAL IMPRESSION      1. Pneumonia of left lower lobe due to infectious organism    2. Pericardial effusion    3. Exacerbation of asthma, unspecified asthma severity, unspecified whether persistent          DISPOSITION/PLAN     DISPOSITION Admitted 01/16/2025 05:29:33 PM               PATIENT REFERRED TO:  No follow-up provider specified.    DISCHARGE MEDICATIONS:  New Prescriptions    No medications on file       DISCONTINUED MEDICATIONS:  Discontinued Medications    No medications on file              (Please note that portions of this note were completed with a voice recognition program.  Efforts were made to edit the

## 2025-01-17 LAB
EKG ATRIAL RATE: 70 BPM
EKG DIAGNOSIS: NORMAL
EKG P AXIS: 39 DEGREES
EKG P-R INTERVAL: 134 MS
EKG Q-T INTERVAL: 426 MS
EKG QRS DURATION: 90 MS
EKG QTC CALCULATION (BAZETT): 460 MS
EKG R AXIS: 48 DEGREES
EKG T AXIS: 34 DEGREES
EKG VENTRICULAR RATE: 70 BPM

## 2025-01-17 PROCEDURE — 97116 GAIT TRAINING THERAPY: CPT

## 2025-01-17 PROCEDURE — 2500000003 HC RX 250 WO HCPCS: Performed by: INTERNAL MEDICINE

## 2025-01-17 PROCEDURE — 92610 EVALUATE SWALLOWING FUNCTION: CPT

## 2025-01-17 PROCEDURE — 6360000002 HC RX W HCPCS: Performed by: INTERNAL MEDICINE

## 2025-01-17 PROCEDURE — 97530 THERAPEUTIC ACTIVITIES: CPT

## 2025-01-17 PROCEDURE — 97161 PT EVAL LOW COMPLEX 20 MIN: CPT

## 2025-01-17 PROCEDURE — 97165 OT EVAL LOW COMPLEX 30 MIN: CPT

## 2025-01-17 PROCEDURE — 97535 SELF CARE MNGMENT TRAINING: CPT

## 2025-01-17 PROCEDURE — 94761 N-INVAS EAR/PLS OXIMETRY MLT: CPT

## 2025-01-17 PROCEDURE — 93010 ELECTROCARDIOGRAM REPORT: CPT | Performed by: INTERNAL MEDICINE

## 2025-01-17 PROCEDURE — 6370000000 HC RX 637 (ALT 250 FOR IP): Performed by: INTERNAL MEDICINE

## 2025-01-17 PROCEDURE — 87205 SMEAR GRAM STAIN: CPT

## 2025-01-17 PROCEDURE — 1200000000 HC SEMI PRIVATE

## 2025-01-17 PROCEDURE — 94640 AIRWAY INHALATION TREATMENT: CPT

## 2025-01-17 RX ORDER — ALBUTEROL SULFATE 0.83 MG/ML
2.5 SOLUTION RESPIRATORY (INHALATION)
Status: DISCONTINUED | OUTPATIENT
Start: 2025-01-17 | End: 2025-01-17

## 2025-01-17 RX ORDER — FLUTICASONE PROPIONATE 50 MCG
1 SPRAY, SUSPENSION (ML) NASAL DAILY PRN
Status: DISCONTINUED | OUTPATIENT
Start: 2025-01-17 | End: 2025-01-20 | Stop reason: HOSPADM

## 2025-01-17 RX ORDER — ALBUTEROL SULFATE 0.83 MG/ML
2.5 SOLUTION RESPIRATORY (INHALATION)
Status: DISCONTINUED | OUTPATIENT
Start: 2025-01-17 | End: 2025-01-20 | Stop reason: HOSPADM

## 2025-01-17 RX ADMIN — SODIUM CHLORIDE, PRESERVATIVE FREE 10 ML: 5 INJECTION INTRAVENOUS at 08:52

## 2025-01-17 RX ADMIN — PANTOPRAZOLE SODIUM 40 MG: 40 TABLET, DELAYED RELEASE ORAL at 05:57

## 2025-01-17 RX ADMIN — BUDESONIDE AND FORMOTEROL FUMARATE DIHYDRATE 2 PUFF: 80; 4.5 AEROSOL RESPIRATORY (INHALATION) at 08:16

## 2025-01-17 RX ADMIN — LOSARTAN POTASSIUM 100 MG: 25 TABLET, FILM COATED ORAL at 08:53

## 2025-01-17 RX ADMIN — BUDESONIDE AND FORMOTEROL FUMARATE DIHYDRATE 2 PUFF: 80; 4.5 AEROSOL RESPIRATORY (INHALATION) at 20:51

## 2025-01-17 RX ADMIN — ZOLPIDEM TARTRATE 10 MG: 5 TABLET ORAL at 20:06

## 2025-01-17 RX ADMIN — ENOXAPARIN SODIUM 40 MG: 100 INJECTION SUBCUTANEOUS at 08:52

## 2025-01-17 RX ADMIN — ASPIRIN 81 MG: 81 TABLET, CHEWABLE ORAL at 20:06

## 2025-01-17 RX ADMIN — GUAIFENESIN AND DEXTROMETHORPHAN 10 ML: 100; 10 SYRUP ORAL at 06:37

## 2025-01-17 RX ADMIN — SODIUM CHLORIDE, PRESERVATIVE FREE 10 ML: 5 INJECTION INTRAVENOUS at 19:38

## 2025-01-17 RX ADMIN — DULOXETINE HYDROCHLORIDE 60 MG: 60 CAPSULE, DELAYED RELEASE ORAL at 20:06

## 2025-01-17 RX ADMIN — RISPERIDONE 2 MG: 1 TABLET, FILM COATED ORAL at 20:06

## 2025-01-17 RX ADMIN — TIOTROPIUM BROMIDE INHALATION SPRAY 2 PUFF: 3.12 SPRAY, METERED RESPIRATORY (INHALATION) at 08:16

## 2025-01-17 RX ADMIN — DULOXETINE HYDROCHLORIDE 60 MG: 60 CAPSULE, DELAYED RELEASE ORAL at 08:53

## 2025-01-17 RX ADMIN — LEVOFLOXACIN 750 MG: 750 INJECTION, SOLUTION INTRAVENOUS at 17:18

## 2025-01-17 RX ADMIN — ALBUTEROL SULFATE 2.5 MG: 2.5 SOLUTION RESPIRATORY (INHALATION) at 08:14

## 2025-01-17 RX ADMIN — MIRTAZAPINE 15 MG: 15 TABLET, FILM COATED ORAL at 20:06

## 2025-01-17 RX ADMIN — SENNOSIDES 8.6 MG: 8.6 TABLET, FILM COATED ORAL at 08:53

## 2025-01-17 RX ADMIN — ALBUTEROL SULFATE 2.5 MG: 2.5 SOLUTION RESPIRATORY (INHALATION) at 20:51

## 2025-01-17 RX ADMIN — DOCUSATE SODIUM 100 MG: 100 CAPSULE, LIQUID FILLED ORAL at 08:53

## 2025-01-17 RX ADMIN — RISPERIDONE 1 MG: 1 TABLET, FILM COATED ORAL at 08:53

## 2025-01-17 NOTE — PROGRESS NOTES
Hospitalist Progress Note      PCP: No primary care provider on file.    Date of Admission: 1/16/2025    LOS: 1    Chief Complaint:   Chief Complaint   Patient presents with    Pneumonia     Came by [facility] from [facility] with c/o pneumonia and worsening cough.        Case Summary:   59 y.o. female with history of GERD, asthma, anxiety, sleep apnea with obesity, intellectual disability, hypertension who presented from [facility] and presented with persistent cough with associated shortness of breath in the setting of History of Pseudomonas MDRO Pseudomonas infection concerning for pneumonia       Active Hospital Problems    Diagnosis Date Noted    Community acquired pneumonia of left lower lobe of lung [J18.9] 01/16/2025    GERD (gastroesophageal reflux disease) [K21.9] 01/16/2025    HTN (hypertension) [I10] 01/16/2025    Mild asthma [J45.909] 01/16/2025         Principal Problem:    Community acquired pneumonia of left lower lobe of lung  Active Problems:    GERD (gastroesophageal reflux disease)    HTN (hypertension)    Mild asthma  Resolved Problems:    * No resolved hospital problems. *    Improved cough and shortness of breath.  - Continue IV antibiotics  - Repeat chest x-ray in 2 to 3 days  - Continue breathing therapy  - Encourage incentive spirometry  - Continue Mucinex      Medications:  Reviewed  Infusion Medications    sodium chloride       Scheduled Medications    albuterol  2.5 mg Nebulization TID RT    levofloxacin  750 mg IntraVENous Q24H    aspirin  81 mg Oral Nightly    docusate sodium  100 mg Oral Daily    DULoxetine  60 mg Oral BID    budesonide-formoterol  2 puff Inhalation BID RT    And    tiotropium  2 puff Inhalation Daily RT    losartan  100 mg Oral Daily    mirtazapine  15 mg Oral Nightly    pantoprazole  40 mg Oral QAM AC    [START ON 1/18/2025] oxyBUTYnin  20 mg Oral Every Other Day    risperiDONE  1 mg Oral QAM    risperiDONE  2 mg Oral Nightly    senna  8.6 mg Oral Daily

## 2025-01-17 NOTE — PROGRESS NOTES
01/16/25 2222   Oxygen Therapy/Pulse Ox   O2 Device (S)  None (Room air)  (pt. denies that she wears a home PAP unit)

## 2025-01-17 NOTE — PROGRESS NOTES
.4 Eyes Skin Assessment     NAME:  Aster Jauregui  YOB: 1965  MEDICAL RECORD NUMBER:  [id number]    The patient is being assessed for  Admission    I agree that at least one RN has performed a thorough Head to Toe Skin Assessment on the patient. ALL assessment sites listed below have been assessed.      Areas assessed by both nurses:    Head, Face, Ears, Shoulders, Back, Chest, Arms, Elbows, Hands, Sacrum. Buttock, Coccyx, Ischium, Legs. Feet and Heels, and Under Medical Devices         Does the Patient have a Wound? No noted wound(s)       Dae Prevention initiated by RN: No  Wound Care Orders initiated by RN: No    Pressure Injury (Stage 3,4, Unstageable, DTI, NWPT, and Complex wounds) if present, place Wound referral order by RN under : No    New Ostomies, if present place, Ostomy referral order under :      Nurse 1 eSignature: Electronically signed by Yvette Card RN on 1/17/25 at 6:43 AM EST    **SHARE this note so that the co-signing nurse can place an eSignature**    Nurse 2 eSignature: Electronically signed by Laura Barakat RN on 1/17/25 at 6:43 AM EST

## 2025-01-17 NOTE — RT PROTOCOL NOTE
RT Nebulizer Bronchodilator Protocol Note    There is a bronchodilator order in the chart from a provider indicating to follow the RT Bronchodilator Protocol and there is an “Initiate RT Bronchodilator Protocol” order as well (see protocol at bottom of note).    CXR Findings:  No results found.    The findings from the last RT Protocol Assessment were as follows:  Smoking: None or smoker <15 pack years  Respiratory Pattern: Dyspnea on exertion or RR 21-25 bpm  Breath Sounds: Inspiratory and expiratory or bilateral wheezing and/or rhonchi  Cough: Strong, productive  Indication for Bronchodilator Therapy: Decreased or absent breath sounds, On home bronchodilators  Bronchodilator Assessment Score: 9    Aerosolized bronchodilator medication orders have been revised according to the RT Nebulizer Bronchodilator Protocol below.    Respiratory Therapist to perform RT Therapy Protocol Assessment initially then follow the protocol.  Repeat RT Therapy Protocol Assessment PRN for score 0-3 or on second treatment, BID, and PRN for scores above 3.    No Indications - adjust the frequency to every 6 hours PRN wheezing or bronchospasm, if no treatments needed after 48 hours then discontinue using Per Protocol order mode.     If indication present, adjust the RT bronchodilator orders based on the Bronchodilator Assessment Score as indicated below.  If a patient is on this medication at home then do not decrease Frequency below that used at home.    0-3 - enter or revise RT bronchodilator order(s) to equivalent RT Bronchodilator order with Frequency of every 4 hours PRN for wheezing or increased work of breathing using Per Protocol order mode.       4-6 - enter or revise RT Bronchodilator order(s) to two equivalent RT bronchodilator orders with one order with BID Frequency and one order with Frequency of every 4 hours PRN wheezing or increased work of breathing using Per Protocol order mode.         7-10 - enter or revise RT

## 2025-01-17 NOTE — PROGRESS NOTES
01/17/25 0109   RT Protocol   History Pulmonary Disease 0   Respiratory pattern 2   Breath sounds 6   Cough 1   Indications for Bronchodilator Therapy Decreased or absent breath sounds;On home bronchodilators   Bronchodilator Assessment Score 9

## 2025-01-17 NOTE — PROGRESS NOTES
[facility] - Inpatient Rehabilitation Department   Phone: [phone]    Physical Therapy    [x] Initial Evaluation            [] Daily Treatment Note         [] Discharge Summary      Patient: Aster Jauregui   : 1965   MRN: [id number]   Date of Service:  2025  Admitting Diagnosis: Community acquired pneumonia of left lower lobe of lung  Current Admission Summary: 59 y.o. female with history of GERD, asthma, anxiety, sleep apnea with obesity, intellectual disability, hypertension who presented from [facility] and presented with persistent cough with associated shortness of breath in the setting of History of Pseudomonas MDRO Pseudomonas infection concerning for pneumonia   Past Medical History:  has a past medical history of Allergic rhinitis, Alopecia, Anterolisthesis of lumbar spine, Anxiety, Asthma, Constipation, Depression, GERD (gastroesophageal reflux disease), Hyperopia, Hypertension, Insomnia, Mild intellectual disability, Obstructive sleep apnea, Osteoarthritis of both knees, Osteopenia, Ovarian cyst, Overactive bladder, Periumbilical hernia, Presbyopia, and Psychotic disorder (HCC).  Past Surgical History:  has no past surgical history on file.  Discharge Recommendations: Aster Jauregui scored a 18/24 on the AM-PAC short mobility form. Current research shows that an AM-PAC score of 18 or greater is typically associated with a discharge to the patient's home setting. Based on the patient's AM-PAC score and their current functional mobility deficits, it is recommended that the patient have 2-3 sessions per week of Physical Therapy at d/c to increase the patient's independence.  At this time, this patient demonstrates the endurance and safety to discharge home with HHPT and a follow up treatment frequency of 2-3x/wk.  Please see assessment section for further patient specific details.    If patient discharges prior to next session this note will serve as a discharge  summary.  Please see below for the latest assessment towards goals.      HOME HEALTH CARE: LEVEL 1 STANDARD    - Initial home health evaluation to occur within 24-48 hours, in patient home   - Therapy to evaluate with goal of regaining prior level of functioning   - Therapy to evaluate if patient has Home Health Aide needs for personal care      DME Required For Discharge: no DME required at discharge  Precautions/Restrictions: medium fall risk, contact precautions, up as tolerated  Weight Bearing Restrictions: no restrictions  [] Right Upper Extremity  [] Left Upper Extremity [] Right Lower Extremity  [] Left Lower Extremity     Required Braces/Orthotics: no braces required   [] Right  [] Left  Positional Restrictions:no positional restrictions    Pre-Admission Information     Type of Home:  [employer] -- group home  Home Layout: one level  Home Access: level entry  Bathroom Layout: walk in shower  Bathroom Equipment: grab bars around toilet, shower chair  Toilet Height: elevated height  Home Equipment: rolling walker, single point cane, adjustable bed  Transfer Assistance: requires assistance, assist for shower transfers only  Ambulation Assistance:Independent without use of device  ADL Assistance: independent with all ADL's  IADL Assistance: requires assistance with all homemaking tasks  Active :        [] Yes  [x] No  Hand Dominance: [x] Left  [] Right  Hobbies: workshops, listen to [employer] music/radio  Recent Falls: Patient denies falls    Available Assistance at Discharge: 24 hr physical assistance available    Examination   Vision:   Vision Corrective Device: wears glasses at all times  Hearing:   WFL  Observation:   General Observation:  PIV, tele, RA  Posture:   WFL  Sensation:   denies numbness and tingling  Proprioception:    WFL  Tone:   Normotonic  Coordination Testing:   WFL    ROM:   (B) LE AROM WFL  Strength:   (B) LE strength grossly WFL  Therapist Clinical Decision Making (Complexity):

## 2025-01-17 NOTE — ED NOTES
Patient Name: Aster Jauregui  : 1965 59 y.o.  MRN: [id number]  ED Room #: ED-0003/03     Chief complaint:   Chief Complaint   Patient presents with    Pneumonia     Came by [facility] from [facility] with c/o pneumonia and worsening cough.      Hospital Problem/Diagnosis:   Hospital Problems             Last Modified POA    * (Principal) Community acquired pneumonia of left lower lobe of lung 2025 Yes    GERD (gastroesophageal reflux disease) 2025 Yes    HTN (hypertension) 2025 Yes    Mild asthma 2025 Yes         O2 Flow Rate:O2 Device: None (Room air)   (if applicable)  Cardiac Rhythm:   (if applicable)  Active LDA's:   Peripheral IV 25 Left Antecubital (Active)            How does patient ambulate? Contact Guard    2. How does patient take pills? Whole with Water    3. Is patient alert? Alert    4. Is patient oriented? To Person, To Place, and Follows Commands    5.   Patient arrived from:  group home  Facility Name: [facility]    6. If patient is disoriented or from a Skill Nursing Facility has family been notified of admission?  N/A    7. Patient belongings? Belongings: Clothing    Disposition of belongings? Kept with Patient     8. Any specific patient or family belongings/needs/dynamics?   a. N/A    9. Miscellaneous comments/pending orders?  a. A&O X 2-3, ABLE TO MAKE NEEDS KNOWN.       If there are any additional questions please reach out to the Emergency Department.  [phone]

## 2025-01-17 NOTE — FLOWSHEET NOTE
01/17/25 0124   Nursing Delirium Screening Scale (Nu-DESC)   Disorientation 0   Innappropriate Behavior 0   Innappropriate Communication 0   Illusions/Hallucinations 0   Psychomotor Retardation 0   Nu-DESC Score (calculated) 0

## 2025-01-17 NOTE — CARE COORDINATION
Case Management Assessment  Initial Evaluation    Date/Time of Evaluation: 1/17/2025 8:22 AM  Assessment Completed by: Phill Nye    If patient is discharged prior to next notation, then this note serves as note for discharge by case management.    Patient Name: Aster Jauregui                   YOB: 1965  Diagnosis: Pericardial effusion [I31.39]  Pneumonia of left lower lobe due to infectious organism [J18.9]  Community acquired pneumonia of left lower lobe of lung [J18.9]  Exacerbation of asthma, unspecified asthma severity, unspecified whether persistent [J45.901]                   Date / Time: 1/16/2025  1:38 PM ROOM: [address] [place], [place] [id number]    Patient Admission Status: Inpatient   Readmission Risk (Low < 19, Mod (19-27), High > 27): Readmission Risk Score: 12.6    Current PCP: No primary care provider on file.  PCP verified by CM? Yes    Chart Reviewed: Yes      History Provided by: Patient, Medical Record  Patient Orientation: Alert and Oriented    Patient Cognition: Alert    Hospitalization in the last 30 days (Readmission):  No    If yes, Readmission Assessment in CM Navigator will be completed.    Advance Directives:      Code Status: Full Code   Patient's Primary Decision Maker is: Legal Next of Kin      Discharge Planning:    Patient expects to discharge to: Group home (From [facility])  Plan for transportation at discharge:      Financial    Payor: MEDICARE / Plan: MEDICARE PART A AND B / Product Type: *No Product type* /         Current Nursing Home Information:  Patient admitted from:  [facility]  [address] [place] [address]  Phone [phone]  Fax [phone]       Patient Covid vaccination status:    Internal Administration   First Dose      Second Dose           Last COVID Lab SARS-CoV-2 RNA, RT PCR (no units)   Date Value   01/16/2025 NOT DETECTED            Covid Test requirement for return: No Rapid/PCR Covid test needed before return      Additional Case

## 2025-01-17 NOTE — PROGRESS NOTES
Goals     Goals:   Dysphagia Goals: Pt will functionally tolerate recommended diet with no overt clinical s/s of aspiration   If s/s of pharyngeal phase dysphagia continue to be noted pt will participate in Modified Barium Swallow Study (MBS)  to further assess pharyngeal phase of swallow, assist with diet recommendations and to further direct plan of care     Above goals reviewed on 1/17/2025.  All goals are ongoing at this time unless indicated above.       POC/Education     Dysphagia Therapeutic Intervention:  Diet Tolerance Monitoring , Patient/Family Education , Therapeutic Trials with SLP     Plan of care: 1-2 times to ensure diet tolerance.    Education:  Provided education regarding role of SLP, results of assessment, recommendations and general speech pathology plan of care:  Pt verbalized understanding and agreement     If patient discharges prior to next visit, this note will serve as discharge.     Treatment time:  Timed Code Treatment Minutes: 0   Total Treatment Time Minutes: 17     Electronically signed by:    Debbie Garsia MA CCC-SLP #[id number]  Speech Language Pathologist

## 2025-01-17 NOTE — PROGRESS NOTES
[facility] - Inpatient Rehabilitation Department   Phone: [phone]    Occupational Therapy    [x] Initial Evaluation            [] Daily Treatment Note         [] Discharge Summary      Patient: Aster Jauregui   : 1965   MRN: [id number]   Date of Service:  2025    Admitting Diagnosis:  Community acquired pneumonia of left lower lobe of lung  Current Admission Summary: 59 y.o. female with history of GERD, asthma, anxiety, sleep apnea with obesity, intellectual disability, hypertension who presented from [facility] and presented with persistent cough with associated shortness of breath in the setting of History of Pseudomonas MDRO Pseudomonas infection concerning for pneumonia   Past Medical History:  has a past medical history of Allergic rhinitis, Alopecia, Anterolisthesis of lumbar spine, Anxiety, Asthma, Constipation, Depression, GERD (gastroesophageal reflux disease), Hyperopia, Hypertension, Insomnia, Mild intellectual disability, Obstructive sleep apnea, Osteoarthritis of both knees, Osteopenia, Ovarian cyst, Overactive bladder, Periumbilical hernia, Presbyopia, and Psychotic disorder (HCC).  Past Surgical History:  has no past surgical history on file.    Discharge Recommendations: Aster Jauregui scored a 18/24 on the AM-PAC ADL Inpatient form. Current research shows that an AM-PAC score of 18 or greater is typically associated with a discharge to the patient's home setting. Based on the patient's AM-PAC score, and their current ADL deficits, it is recommended that the patient have 2-3 sessions per week of Occupational Therapy at d/c to increase the patient's independence.  At this time, this patient demonstrates the endurance and safety to discharge home with home services (home vs OP services) and a follow up treatment frequency of 2-3x/wk.   Please see assessment section for further patient specific details.    If patient discharges prior to next session this note will  endurance, decreased balance, decreased IADL  Prognosis: good  Clinical Assessment: The patient is a 59 y.o. female who presents below their baseline level of function due to above deficits, associated with Community acquired pneumonia of left lower lobe of lung. Typically, pt is IND. Currently, pt is CGA for mobility, SBA/min A for ADL. Continued OT indicated in order to promote return to PLOF    Safety Interventions: patient left in chair, chair alarm in place, call light within reach, gait belt, and nurse notified    Plan  Frequency: 3-5 x/per week  Current Treatment Recommendations: strengthening, balance training, functional mobility training, transfer training, endurance training, patient/caregiver education, ADL/self-care training, IADL training, home management training, safety education, and equipment evaluation/education    Goals    Short Term Goals:  Time Frame: by dc  Patient will complete upper body ADL at modified independent   Patient will complete lower body ADL at modified independent   Patient will complete toileting at modified independent   Patient will complete grooming at modified independent   Patient will complete functional transfers at modified independent   Patient will complete functional mobility at modified independent     Above goals reviewed on 1/17/2025.  All goals are ongoing at this time unless indicated above.       Therapy Session Time     Individual Group Co-treatment   Time In    1302   Time Out    1346   Minutes    44        Timed Code Treatment Minutes:   29  Total Treatment Minutes:  44       Electronically Signed By: NELLIE Mcmillan, YVAN, OTR/L, CNS ([id number])

## 2025-01-17 NOTE — PROGRESS NOTES
Physician Progress Note      PATIENT:               STEPHEN VANG  CSN #:                  [id number]  :                       1965  ADMIT DATE:       2025 1:38 PM  DISCH DATE:  RESPONDING  PROVIDER #:        Alem Talavera MD          QUERY TEXT:    Patient admitted with BMI 46.82 If possible, please document in progress notes   and discharge summary if you are evaluating and /or treating any of the   following:    The medical record reflects the following:  Risk Factors: Asthma, mild intellectual disability, HTN, anxiety, depression  Clinical Indicators: bmi-- 46  Treatment: essential home meds, supportive care    Thank you,  Bubba Quinonez RN,BSB    Specificity of obesity and morbid obesity should be reported based on   physician documentation, as there are several published classifications and   definitions?  MS-DRG Training Guide. CDC:   https://www.cdc.gov/obesity/basics/adult-defining.html. WHO:   https://www.who.int/news-room/fact-sheets/detail/obesity-and-overweight. NIH:   https://www.nhlbi.nih.gov/health/educational/lose_wt/BMI/bmi_dis.htm  Options provided:  -- Morbid obesity  -- Other - I will add my own diagnosis  -- Disagree - Not applicable / Not valid  -- Disagree - Clinically unable to determine / Unknown  -- Refer to Clinical Documentation Reviewer    PROVIDER RESPONSE TEXT:    This patient has morbid obesity.    Query created by: Bubba Quinonez on 2025 9:24 AM      Electronically signed by:  Alem Talavera MD 2025 12:40 PM

## 2025-01-18 PROCEDURE — 94761 N-INVAS EAR/PLS OXIMETRY MLT: CPT

## 2025-01-18 PROCEDURE — 2500000003 HC RX 250 WO HCPCS: Performed by: INTERNAL MEDICINE

## 2025-01-18 PROCEDURE — 6360000002 HC RX W HCPCS: Performed by: INTERNAL MEDICINE

## 2025-01-18 PROCEDURE — 1200000000 HC SEMI PRIVATE

## 2025-01-18 PROCEDURE — 6370000000 HC RX 637 (ALT 250 FOR IP): Performed by: INTERNAL MEDICINE

## 2025-01-18 PROCEDURE — 94640 AIRWAY INHALATION TREATMENT: CPT

## 2025-01-18 RX ADMIN — RISPERIDONE 2 MG: 1 TABLET, FILM COATED ORAL at 21:14

## 2025-01-18 RX ADMIN — SENNOSIDES 8.6 MG: 8.6 TABLET, FILM COATED ORAL at 09:23

## 2025-01-18 RX ADMIN — DOCUSATE SODIUM 100 MG: 100 CAPSULE, LIQUID FILLED ORAL at 09:23

## 2025-01-18 RX ADMIN — ENOXAPARIN SODIUM 40 MG: 100 INJECTION SUBCUTANEOUS at 09:22

## 2025-01-18 RX ADMIN — LEVOFLOXACIN 750 MG: 750 INJECTION, SOLUTION INTRAVENOUS at 18:00

## 2025-01-18 RX ADMIN — ALBUTEROL SULFATE 2.5 MG: 2.5 SOLUTION RESPIRATORY (INHALATION) at 20:34

## 2025-01-18 RX ADMIN — BUDESONIDE AND FORMOTEROL FUMARATE DIHYDRATE 2 PUFF: 80; 4.5 AEROSOL RESPIRATORY (INHALATION) at 09:53

## 2025-01-18 RX ADMIN — ASPIRIN 81 MG: 81 TABLET, CHEWABLE ORAL at 21:14

## 2025-01-18 RX ADMIN — MIRTAZAPINE 15 MG: 15 TABLET, FILM COATED ORAL at 21:14

## 2025-01-18 RX ADMIN — LOSARTAN POTASSIUM 100 MG: 25 TABLET, FILM COATED ORAL at 09:22

## 2025-01-18 RX ADMIN — TIOTROPIUM BROMIDE INHALATION SPRAY 2 PUFF: 3.12 SPRAY, METERED RESPIRATORY (INHALATION) at 09:53

## 2025-01-18 RX ADMIN — ALBUTEROL SULFATE 2.5 MG: 2.5 SOLUTION RESPIRATORY (INHALATION) at 09:53

## 2025-01-18 RX ADMIN — DULOXETINE HYDROCHLORIDE 60 MG: 60 CAPSULE, DELAYED RELEASE ORAL at 09:23

## 2025-01-18 RX ADMIN — SODIUM CHLORIDE, PRESERVATIVE FREE 10 ML: 5 INJECTION INTRAVENOUS at 21:15

## 2025-01-18 RX ADMIN — ZOLPIDEM TARTRATE 10 MG: 5 TABLET ORAL at 21:14

## 2025-01-18 RX ADMIN — PANTOPRAZOLE SODIUM 40 MG: 40 TABLET, DELAYED RELEASE ORAL at 05:47

## 2025-01-18 RX ADMIN — BUDESONIDE AND FORMOTEROL FUMARATE DIHYDRATE 2 PUFF: 80; 4.5 AEROSOL RESPIRATORY (INHALATION) at 20:34

## 2025-01-18 RX ADMIN — SODIUM CHLORIDE, PRESERVATIVE FREE 10 ML: 5 INJECTION INTRAVENOUS at 09:23

## 2025-01-18 RX ADMIN — DULOXETINE HYDROCHLORIDE 60 MG: 60 CAPSULE, DELAYED RELEASE ORAL at 21:14

## 2025-01-18 RX ADMIN — OXYBUTYNIN CHLORIDE 20 MG: 5 TABLET, EXTENDED RELEASE ORAL at 09:23

## 2025-01-18 RX ADMIN — RISPERIDONE 1 MG: 1 TABLET, FILM COATED ORAL at 09:23

## 2025-01-18 NOTE — PROGRESS NOTES
Hospitalist Progress Note      PCP: No primary care provider on file.    Date of Admission: 1/16/2025    LOS: 2    Chief Complaint:   Chief Complaint   Patient presents with    Pneumonia     Came by [facility] from [facility] with c/o pneumonia and worsening cough.        Case Summary:   59 y.o. female with history of GERD, asthma, anxiety, sleep apnea with obesity, intellectual disability, hypertension who presented from [facility] and presented with persistent cough with associated shortness of breath in the setting of History of Pseudomonas MDRO Pseudomonas infection concerning for pneumonia       Active Hospital Problems    Diagnosis Date Noted    Community acquired pneumonia of left lower lobe of lung [J18.9] 01/16/2025    GERD (gastroesophageal reflux disease) [K21.9] 01/16/2025    HTN (hypertension) [I10] 01/16/2025    Mild asthma [J45.909] 01/16/2025         Principal Problem:    Community acquired pneumonia of left lower lobe of lung: Slowly improving.  Remains on room air.  Afebrile with no leukocytosis.  - Continue antibiotics and will switch to oral tomorrow in view of discharge planing  - Repeat chest x-ray tomorrow  - Encourage incentive spirometry  - Continue Flonase    Active Problems:    GERD (gastroesophageal reflux disease): Stable.  On Protonix    HTN (hypertension): Stable on losartan    Mild asthma: Without exacerbation.  Continue inhalers        Medications:  Reviewed  Infusion Medications    sodium chloride       Scheduled Medications    albuterol  2.5 mg Nebulization TID RT    levofloxacin  750 mg IntraVENous Q24H    aspirin  81 mg Oral Nightly    docusate sodium  100 mg Oral Daily    DULoxetine  60 mg Oral BID    budesonide-formoterol  2 puff Inhalation BID RT    And    tiotropium  2 puff Inhalation Daily RT    losartan  100 mg Oral Daily    mirtazapine  15 mg Oral Nightly    pantoprazole  40 mg Oral QAM AC    oxyBUTYnin  20 mg Oral Every Other Day    risperiDONE  1 mg Oral QAM

## 2025-01-18 NOTE — PLAN OF CARE
Problem: Safety - Adult  Goal: Free from fall injury  Outcome: Progressing     Problem: Respiratory - Adult  Goal: Achieves optimal ventilation and oxygenation  Outcome: Progressing  Flowsheets (Taken 1/17/2025 2008)  Achieves optimal ventilation and oxygenation: Assess for changes in respiratory status

## 2025-01-19 ENCOUNTER — APPOINTMENT (OUTPATIENT)
Dept: GENERAL RADIOLOGY | Age: 60
DRG: 194 | End: 2025-01-19
Payer: MEDICARE

## 2025-01-19 LAB
ANION GAP SERPL CALCULATED.3IONS-SCNC: 8 MMOL/L (ref 3–16)
BASOPHILS # BLD: 0 K/UL (ref 0–0.2)
BASOPHILS NFR BLD: 0.7 %
BUN SERPL-MCNC: 21 MG/DL (ref 7–20)
CALCIUM SERPL-MCNC: 9.8 MG/DL (ref 8.3–10.6)
CHLORIDE SERPL-SCNC: 103 MMOL/L (ref 99–110)
CO2 SERPL-SCNC: 26 MMOL/L (ref 21–32)
CREAT SERPL-MCNC: 1 MG/DL (ref 0.6–1.1)
DEPRECATED RDW RBC AUTO: 13.7 % (ref 12.4–15.4)
EOSINOPHIL # BLD: 0.1 K/UL (ref 0–0.6)
EOSINOPHIL NFR BLD: 2.5 %
GFR SERPLBLD CREATININE-BSD FMLA CKD-EPI: 65 ML/MIN/{1.73_M2}
GLUCOSE SERPL-MCNC: 96 MG/DL (ref 70–99)
HCT VFR BLD AUTO: 34.5 % (ref 36–48)
HGB BLD-MCNC: 11.8 G/DL (ref 12–16)
LYMPHOCYTES # BLD: 1 K/UL (ref 1–5.1)
LYMPHOCYTES NFR BLD: 18.7 %
MCH RBC QN AUTO: 31.2 PG (ref 26–34)
MCHC RBC AUTO-ENTMCNC: 34.1 G/DL (ref 31–36)
MCV RBC AUTO: 91.6 FL (ref 80–100)
MONOCYTES # BLD: 0.6 K/UL (ref 0–1.3)
MONOCYTES NFR BLD: 11.8 %
NEUTROPHILS # BLD: 3.6 K/UL (ref 1.7–7.7)
NEUTROPHILS NFR BLD: 66.3 %
PLATELET # BLD AUTO: 271 K/UL (ref 135–450)
PMV BLD AUTO: 6.8 FL (ref 5–10.5)
POTASSIUM SERPL-SCNC: 4.8 MMOL/L (ref 3.5–5.1)
RBC # BLD AUTO: 3.77 M/UL (ref 4–5.2)
SODIUM SERPL-SCNC: 137 MMOL/L (ref 136–145)
WBC # BLD AUTO: 5.4 K/UL (ref 4–11)

## 2025-01-19 PROCEDURE — 6360000002 HC RX W HCPCS: Performed by: INTERNAL MEDICINE

## 2025-01-19 PROCEDURE — 36415 COLL VENOUS BLD VENIPUNCTURE: CPT

## 2025-01-19 PROCEDURE — 6370000000 HC RX 637 (ALT 250 FOR IP): Performed by: INTERNAL MEDICINE

## 2025-01-19 PROCEDURE — 85025 COMPLETE CBC W/AUTO DIFF WBC: CPT

## 2025-01-19 PROCEDURE — 80048 BASIC METABOLIC PNL TOTAL CA: CPT

## 2025-01-19 PROCEDURE — 94640 AIRWAY INHALATION TREATMENT: CPT

## 2025-01-19 PROCEDURE — 2500000003 HC RX 250 WO HCPCS: Performed by: INTERNAL MEDICINE

## 2025-01-19 PROCEDURE — 94761 N-INVAS EAR/PLS OXIMETRY MLT: CPT

## 2025-01-19 PROCEDURE — 94669 MECHANICAL CHEST WALL OSCILL: CPT

## 2025-01-19 PROCEDURE — 1200000000 HC SEMI PRIVATE

## 2025-01-19 PROCEDURE — 71045 X-RAY EXAM CHEST 1 VIEW: CPT

## 2025-01-19 RX ADMIN — RISPERIDONE 1 MG: 1 TABLET, FILM COATED ORAL at 08:26

## 2025-01-19 RX ADMIN — BUDESONIDE AND FORMOTEROL FUMARATE DIHYDRATE 2 PUFF: 80; 4.5 AEROSOL RESPIRATORY (INHALATION) at 19:46

## 2025-01-19 RX ADMIN — LOSARTAN POTASSIUM 100 MG: 25 TABLET, FILM COATED ORAL at 10:13

## 2025-01-19 RX ADMIN — ASPIRIN 81 MG: 81 TABLET, CHEWABLE ORAL at 20:42

## 2025-01-19 RX ADMIN — DULOXETINE HYDROCHLORIDE 60 MG: 60 CAPSULE, DELAYED RELEASE ORAL at 08:26

## 2025-01-19 RX ADMIN — SODIUM CHLORIDE, PRESERVATIVE FREE 10 ML: 5 INJECTION INTRAVENOUS at 20:43

## 2025-01-19 RX ADMIN — ZOLPIDEM TARTRATE 10 MG: 5 TABLET ORAL at 20:42

## 2025-01-19 RX ADMIN — ALBUTEROL SULFATE 2.5 MG: 2.5 SOLUTION RESPIRATORY (INHALATION) at 14:50

## 2025-01-19 RX ADMIN — ALBUTEROL SULFATE 2.5 MG: 2.5 SOLUTION RESPIRATORY (INHALATION) at 19:46

## 2025-01-19 RX ADMIN — DOCUSATE SODIUM 100 MG: 100 CAPSULE, LIQUID FILLED ORAL at 08:26

## 2025-01-19 RX ADMIN — MIRTAZAPINE 15 MG: 15 TABLET, FILM COATED ORAL at 20:42

## 2025-01-19 RX ADMIN — LEVOFLOXACIN 750 MG: 750 INJECTION, SOLUTION INTRAVENOUS at 17:40

## 2025-01-19 RX ADMIN — RISPERIDONE 2 MG: 1 TABLET, FILM COATED ORAL at 20:42

## 2025-01-19 RX ADMIN — ENOXAPARIN SODIUM 40 MG: 100 INJECTION SUBCUTANEOUS at 08:30

## 2025-01-19 RX ADMIN — TIOTROPIUM BROMIDE INHALATION SPRAY 2 PUFF: 3.12 SPRAY, METERED RESPIRATORY (INHALATION) at 07:59

## 2025-01-19 RX ADMIN — DULOXETINE HYDROCHLORIDE 60 MG: 60 CAPSULE, DELAYED RELEASE ORAL at 20:42

## 2025-01-19 RX ADMIN — SODIUM CHLORIDE, PRESERVATIVE FREE 10 ML: 5 INJECTION INTRAVENOUS at 08:30

## 2025-01-19 RX ADMIN — ALBUTEROL SULFATE 2.5 MG: 2.5 SOLUTION RESPIRATORY (INHALATION) at 07:58

## 2025-01-19 RX ADMIN — BUDESONIDE AND FORMOTEROL FUMARATE DIHYDRATE 2 PUFF: 80; 4.5 AEROSOL RESPIRATORY (INHALATION) at 07:59

## 2025-01-19 RX ADMIN — PANTOPRAZOLE SODIUM 40 MG: 40 TABLET, DELAYED RELEASE ORAL at 05:50

## 2025-01-19 RX ADMIN — SENNOSIDES 8.6 MG: 8.6 TABLET, FILM COATED ORAL at 08:26

## 2025-01-19 NOTE — PROGRESS NOTES
Shift assessment completed. Routine vitals obtained and stable. Scheduled medications given. Patient is awake, alert and oriented. Respirations are easy and unlabored. Patient does not appear to be in distress, resting comfortably in the recliner at this time. Call light within reach.

## 2025-01-19 NOTE — PLAN OF CARE
Problem: Safety - Adult  Goal: Free from fall injury  1/18/2025 2335 by Kenna Lora RN  Outcome: Progressing  1/18/2025 1901 by Ligia Conte RN  Outcome: Progressing     Problem: Respiratory - Adult  Goal: Achieves optimal ventilation and oxygenation  1/18/2025 2335 by Kenna Lora RN  Outcome: Progressing  1/18/2025 1901 by Ligia Conte, RN  Outcome: Progressing

## 2025-01-19 NOTE — PROGRESS NOTES
Hospitalist Progress Note      PCP: No primary care provider on file.    Date of Admission: 1/16/2025    LOS: 3    Chief Complaint:   Chief Complaint   Patient presents with    Pneumonia     Came by [facility] from [facility] with c/o pneumonia and worsening cough.        Case Summary:   59 y.o. female with history of GERD, asthma, anxiety, sleep apnea with obesity, intellectual disability, hypertension who presented from [facility] and presented with persistent cough with associated shortness of breath in the setting of History of Pseudomonas MDRO Pseudomonas infection concerning for pneumonia       Active Hospital Problems    Diagnosis Date Noted    Community acquired pneumonia of left lower lobe of lung [J18.9] 01/16/2025    GERD (gastroesophageal reflux disease) [K21.9] 01/16/2025    HTN (hypertension) [I10] 01/16/2025    Mild asthma [J45.909] 01/16/2025         Principal Problem:    Community acquired pneumonia of left lower lobe of lung: Reports feeling much better today.  Remains on room air.  - Afebrile with no leukocytosis  - Repeat chest x-ray today  - Continue antibiotics and switch to oral  - Encourage incentive spirometry, Flonase  - Ambulate on the floor    Active Problems:    GERD (gastroesophageal reflux disease): Stable.  On Protonix    HTN (hypertension): Stable on losartan    Mild asthma: Without exacerbation.  Continue inhalers        Medications:  Reviewed  Infusion Medications    sodium chloride       Scheduled Medications    albuterol  2.5 mg Nebulization TID RT    levofloxacin  750 mg IntraVENous Q24H    aspirin  81 mg Oral Nightly    docusate sodium  100 mg Oral Daily    DULoxetine  60 mg Oral BID    budesonide-formoterol  2 puff Inhalation BID RT    And    tiotropium  2 puff Inhalation Daily RT    losartan  100 mg Oral Daily    mirtazapine  15 mg Oral Nightly    pantoprazole  40 mg Oral QAM AC    oxyBUTYnin  20 mg Oral Every Other Day    risperiDONE  1 mg Oral QAM    risperiDONE  2

## 2025-01-20 VITALS
SYSTOLIC BLOOD PRESSURE: 140 MMHG | BODY MASS INDEX: 48.3 KG/M2 | DIASTOLIC BLOOD PRESSURE: 81 MMHG | WEIGHT: 208.7 LBS | HEART RATE: 63 BPM | RESPIRATION RATE: 16 BRPM | OXYGEN SATURATION: 97 % | HEIGHT: 55 IN | TEMPERATURE: 97.6 F

## 2025-01-20 LAB
ANION GAP SERPL CALCULATED.3IONS-SCNC: 7 MMOL/L (ref 3–16)
BACTERIA BLD CULT ORG #2: NORMAL
BACTERIA BLD CULT: NORMAL
BASOPHILS # BLD: 0 K/UL (ref 0–0.2)
BASOPHILS NFR BLD: 0.7 %
BUN SERPL-MCNC: 22 MG/DL (ref 7–20)
CALCIUM SERPL-MCNC: 9.5 MG/DL (ref 8.3–10.6)
CHLORIDE SERPL-SCNC: 103 MMOL/L (ref 99–110)
CO2 SERPL-SCNC: 25 MMOL/L (ref 21–32)
CREAT SERPL-MCNC: 1.1 MG/DL (ref 0.6–1.1)
DEPRECATED RDW RBC AUTO: 13.6 % (ref 12.4–15.4)
EOSINOPHIL # BLD: 0.1 K/UL (ref 0–0.6)
EOSINOPHIL NFR BLD: 2.3 %
GFR SERPLBLD CREATININE-BSD FMLA CKD-EPI: 58 ML/MIN/{1.73_M2}
GLUCOSE SERPL-MCNC: 94 MG/DL (ref 70–99)
HCT VFR BLD AUTO: 34.2 % (ref 36–48)
HGB BLD-MCNC: 11.9 G/DL (ref 12–16)
LYMPHOCYTES # BLD: 1 K/UL (ref 1–5.1)
LYMPHOCYTES NFR BLD: 19.4 %
MCH RBC QN AUTO: 31.4 PG (ref 26–34)
MCHC RBC AUTO-ENTMCNC: 34.9 G/DL (ref 31–36)
MCV RBC AUTO: 90 FL (ref 80–100)
MONOCYTES # BLD: 0.6 K/UL (ref 0–1.3)
MONOCYTES NFR BLD: 12.2 %
NEUTROPHILS # BLD: 3.5 K/UL (ref 1.7–7.7)
NEUTROPHILS NFR BLD: 65.4 %
PLATELET # BLD AUTO: 275 K/UL (ref 135–450)
PMV BLD AUTO: 6.9 FL (ref 5–10.5)
POTASSIUM SERPL-SCNC: 4.4 MMOL/L (ref 3.5–5.1)
RBC # BLD AUTO: 3.8 M/UL (ref 4–5.2)
SODIUM SERPL-SCNC: 135 MMOL/L (ref 136–145)
WBC # BLD AUTO: 5.3 K/UL (ref 4–11)

## 2025-01-20 PROCEDURE — 94761 N-INVAS EAR/PLS OXIMETRY MLT: CPT

## 2025-01-20 PROCEDURE — 6360000002 HC RX W HCPCS: Performed by: INTERNAL MEDICINE

## 2025-01-20 PROCEDURE — 2500000003 HC RX 250 WO HCPCS: Performed by: INTERNAL MEDICINE

## 2025-01-20 PROCEDURE — 94669 MECHANICAL CHEST WALL OSCILL: CPT

## 2025-01-20 PROCEDURE — 85025 COMPLETE CBC W/AUTO DIFF WBC: CPT

## 2025-01-20 PROCEDURE — 36415 COLL VENOUS BLD VENIPUNCTURE: CPT

## 2025-01-20 PROCEDURE — 80048 BASIC METABOLIC PNL TOTAL CA: CPT

## 2025-01-20 PROCEDURE — 6370000000 HC RX 637 (ALT 250 FOR IP): Performed by: INTERNAL MEDICINE

## 2025-01-20 PROCEDURE — 94640 AIRWAY INHALATION TREATMENT: CPT

## 2025-01-20 RX ORDER — LEVOFLOXACIN 250 MG/1
250 TABLET, FILM COATED ORAL DAILY
Qty: 3 TABLET | Refills: 0 | Status: SHIPPED | OUTPATIENT
Start: 2025-01-20 | End: 2025-01-23

## 2025-01-20 RX ADMIN — ENOXAPARIN SODIUM 40 MG: 100 INJECTION SUBCUTANEOUS at 10:12

## 2025-01-20 RX ADMIN — PANTOPRAZOLE SODIUM 40 MG: 40 TABLET, DELAYED RELEASE ORAL at 05:25

## 2025-01-20 RX ADMIN — SENNOSIDES 8.6 MG: 8.6 TABLET, FILM COATED ORAL at 10:13

## 2025-01-20 RX ADMIN — SODIUM CHLORIDE, PRESERVATIVE FREE 10 ML: 5 INJECTION INTRAVENOUS at 10:13

## 2025-01-20 RX ADMIN — ALBUTEROL SULFATE 2.5 MG: 2.5 SOLUTION RESPIRATORY (INHALATION) at 12:38

## 2025-01-20 RX ADMIN — OXYBUTYNIN CHLORIDE 20 MG: 5 TABLET, EXTENDED RELEASE ORAL at 10:12

## 2025-01-20 RX ADMIN — RISPERIDONE 1 MG: 1 TABLET, FILM COATED ORAL at 10:12

## 2025-01-20 RX ADMIN — DOCUSATE SODIUM 100 MG: 100 CAPSULE, LIQUID FILLED ORAL at 10:13

## 2025-01-20 RX ADMIN — BUDESONIDE AND FORMOTEROL FUMARATE DIHYDRATE 2 PUFF: 80; 4.5 AEROSOL RESPIRATORY (INHALATION) at 07:49

## 2025-01-20 RX ADMIN — DULOXETINE HYDROCHLORIDE 60 MG: 60 CAPSULE, DELAYED RELEASE ORAL at 10:13

## 2025-01-20 RX ADMIN — TIOTROPIUM BROMIDE INHALATION SPRAY 2 PUFF: 3.12 SPRAY, METERED RESPIRATORY (INHALATION) at 07:49

## 2025-01-20 RX ADMIN — ALBUTEROL SULFATE 2.5 MG: 2.5 SOLUTION RESPIRATORY (INHALATION) at 07:47

## 2025-01-20 NOTE — PLAN OF CARE
Problem: Safety - Adult  Goal: Free from fall injury  1/19/2025 2248 by Kenna Lora RN  Outcome: Progressing  1/19/2025 1904 by Ligia Conte RN  Outcome: Progressing     Problem: Respiratory - Adult  Goal: Achieves optimal ventilation and oxygenation  1/19/2025 2248 by Kenna Lora RN  Outcome: Progressing  1/19/2025 1904 by Ligia Conte, RN  Outcome: Progressing

## 2025-01-20 NOTE — DISCHARGE INSTR - COC
Continuity of Care Form    Patient Name: Aster Jauregui   :  1965  MRN:  [id number]    Admit date:  2025  Discharge date:  2025    Code Status Order: Full Code   Advance Directives:   Advance Care Flowsheet Documentation             Admitting Physician:  Alem Talavera MD  PCP: No primary care provider on file.    Discharging Nurse: Ligia ROWLAND RN   Discharging Hospital Unit/Room#: 5TN-5558/5558-01  Discharging Unit Phone Number: [phone]    Emergency Contact:   Extended Emergency Contact Information  Primary Emergency Contact: afia kim  Home Phone: [phone]  Relation: Friend    Past Surgical History:  History reviewed. No pertinent surgical history.    Immunization History:     There is no immunization history on file for this patient.    Active Problems:  Patient Active Problem List   Diagnosis Code    Chest pain R07.9    JOSE A on CPAP G47.33    Injury of left knee S89.92XA    Closed displaced longitudinal fracture of left patella S82.022A    Community acquired pneumonia of left lower lobe of lung J18.9    GERD (gastroesophageal reflux disease) K21.9    HTN (hypertension) I10    Mild asthma J45.909       Isolation/Infection:   Isolation            Contact  Contact          Patient Infection Status       Infection Onset Added Last Indicated Last Indicated By Review Planned Expiration Resolved Resolved By    MDRO (multi-drug resistant organism) 23  Mack Robertson        Added from external infection.                       Nurse Assessment:  Last Vital Signs: /64   Pulse 60   Temp 97.4 °F (36.3 °C) (Oral)   Resp 18   Ht 1.372 m (4' 6\")   Wt 94.7 kg (208 lb 11.2 oz)   SpO2 97%   BMI 50.32 kg/m²     Last documented pain score (0-10 scale):    Last Weight:   Wt Readings from Last 1 Encounters:   25 94.7 kg (208 lb 11.2 oz)     Mental Status:  oriented and alert    IV Access:  - None    Nursing Mobility/ADLs:  Walking   Assisted  Transfer

## 2025-01-20 NOTE — PLAN OF CARE
Problem: Safety - Adult  Goal: Free from fall injury  Outcome: Adequate for Discharge     Problem: Respiratory - Adult  Goal: Achieves optimal ventilation and oxygenation  Outcome: Adequate for Discharge

## 2025-01-20 NOTE — CARE COORDINATION
Case Management -  Discharge Note      Patient Name: Aster Jauregui                   YOB: 1965  Room: [facility]5558/5558-01            Readmission Risk (Low < 19, Mod (19-27), High > 27): Readmission Risk Score: 14.3    Current PCP: No primary care provider on file.      (IMM) Important Message from Medicare:    Has pt received appropriate compliance notices before being discharged if required: yes  Compliance doc:  [x] 2nd IMM; [] Code 44 [] [place]  Date Given: 1/20/2024 Given By: Mable    PT AM-PAC: 18 /24  OT AM-PAC: 18 /24      Patient noted to have a discharge order.  Pt has been medically cleared to return to [facility]    Patient discharged to   [facility] #: 7  [address] [place] [address]  Phone: [phone]  Fax: [phone]        Transportation scheduled for 3pm  Transportation provided by [employer] [phone]   AVS faxed and agency notified: Yes  The following prescriptions sent with pt:    Family Notified: Yes  Nurse to call report to facility        Financial    Payor: MEDICARE / Plan: MEDICARE PART A AND B / Product Type: *No Product type* /     Pharmacy:  Potential assistance Purchasing Medications: No  Meds-to-Beds request:        [facility] - [place], [place] - [address] [phone] - F [phone]  [address] [address]  Phone: [phone] Fax: [phone]      Notes:    Additional Case Management Notes: GUCCI spoke with the nurse at [facility] and informed her therapy worked with the patient and recommended [facility]. The nurse stated they have a PT and OT that comes twice a week to work with the patient's and will notify them once the patient return home.    Electronically signed by ALPHONSE Millan on 1/20/2025 at 11:39 AM

## 2025-01-20 NOTE — DISCHARGE SUMMARY
clear.  Neck: Supple, with full range of motion. No jugular venous distention. Trachea midline.  Respiratory:  Normal respiratory effort. Clear to auscultation, bilaterally without Rales/Wheezes/Rhonchi.  Cardiovascular:  Regular rate and rhythm with normal S1/S2 without murmurs, rubs or gallops.  Abdomen: Soft, non-tender, non-distended with normal bowel sounds.  Musculoskeletal:  No clubbing, cyanosis or edema bilaterally.  Full range of motion without deformity.  Skin: Skin color, texture, turgor normal.  No rashes or lesions.  Neurologic:  Neurovascularly intact without any focal sensory/motor deficits. Cranial nerves: II-XII intact, grossly non-focal.  Psychiatric:  Alert and oriented, thought content appropriate, normal insight  Capillary Refill: Brisk,< 3 seconds   Peripheral Pulses: +2 palpable, equal bilaterally       Labs: For convenience and continuity at follow-up the following most recent labs are provided:      CBC:    Lab Results   Component Value Date/Time    WBC 5.3 01/20/2025 05:19 AM    HGB 11.9 01/20/2025 05:19 AM    HCT 34.2 01/20/2025 05:19 AM     01/20/2025 05:19 AM       Renal:    Lab Results   Component Value Date/Time     01/20/2025 05:19 AM    K 4.4 01/20/2025 05:19 AM    K 4.1 01/16/2025 02:38 PM     01/20/2025 05:19 AM    CO2 25 01/20/2025 05:19 AM    BUN 22 01/20/2025 05:19 AM    CREATININE 1.1 01/20/2025 05:19 AM    CALCIUM 9.5 01/20/2025 05:19 AM         Significant Diagnostic Studies    Radiology:   XR CHEST PORTABLE   Final Result   Left basilar opacity seen on recent CT is less conspicuous on this single   frontal view. No new focal consolidation.         CT CHEST PULMONARY EMBOLISM W CONTRAST   Final Result   1. No evidence of pulmonary embolism.   2. Moderate pericardial effusion.   3. Left lower lobe airspace disease/atelectasis.   4. Multiple small thyroid lesions bilaterally.  Recommend follow-up   nonemergent thyroid ultrasound.                Consults:  2 times daily as needed (ARTHRITIS)      nitroGLYCERIN (NITROSTAT) 0.4 MG SL tablet Place 1 tablet under the tongue every 5 minutes as needed for Chest pain up to max of 3 total doses. If no relief after 1 dose, call 911.      albuterol sulfate HFA (VENTOLIN HFA) 108 (90 Base) MCG/ACT inhaler Inhale 2 puffs into the lungs every 6 hours as needed for Wheezing or Shortness of Breath      Misc. Devices (WALKER) MISC 1 each by Does not apply route daily  Qty: 1 each, Refills: 0      guaiFENesin (ROBITUSSIN) 100 MG/5ML liquid Take 10 mLs by mouth every 4 hours as needed      loperamide (IMODIUM) 2 MG capsule Take 2 capsules by mouth as needed       !! - Potential duplicate medications found. Please discuss with provider.          Time Spent on discharge: 35 minutes in the examination, evaluation, counseling and review of medications and discharge plan.      Signed:    Alem Talavera MD   1/20/2025      Thank you No primary care provider on file. for the opportunity to be involved in this patient's care. If you have any questions or concerns, please feel free to contact me at [phone].

## 2025-01-20 NOTE — CARE COORDINATION
01/20/25 1251   IMM Letter   IMM Letter given to Patient/Family/Significant other/Guardian/POA/by: IMM given by CM   IMM Letter date given: 01/20/25   IMM Letter time given: 1220  (Pt agreeable to IMM w/o 4 hr. notice.)

## 2025-01-20 NOTE — PROGRESS NOTES
Shift assessment completed. Routine vitals stable. Scheduled medications given. Patient is awake, alert and oriented and states \"I feel much better today.\"  Respirations are easy and unlabored. Patient does not appear to be in distress, resting comfortably at this time. Call light within reach.

## 2025-01-20 NOTE — PROGRESS NOTES
Patient discharging back to [facility] today. EMS here to transport patient. IV access removed without complication and dry dressing applied. Patient tolerated well. Brief handoff given to EMS and report called to Abi at the facility.

## 2025-01-29 NOTE — PROGRESS NOTES
Physician Progress Note      PATIENT:               STEPHEN VANG  CSN #:                  [id number]  :                       1965  ADMIT DATE:       2025 1:38 PM  DISCH DATE:        2025 3:00 PM  RESPONDING  PROVIDER #:        Alem Talavera MD          QUERY TEXT:    Pt admitted with worsening cough.   Pt noted to have PNA.  If possible, please   document in the progress notes and discharge summary if you are evaluating   and/or treating any of the following:    Note: CAP and HCAP indicate where the pneumonia was acquired, not a specific   type.    The medical record reflects the following:  Risk Factors: PNA, asthma, sleep apnea, anxiety, obesity  Clinical Indicators:  ED note- --\" levoFLOXacin (LEVAQUIN) 750 MG/150ML infusion 750 mg (has no   administration in time range)\"  CT --\" 3. Left lower lobe airspace disease/atelectasis.\"  H&P--\" shortness of breath in the setting of History of Pseudomonas MDRO   Pseudomonas infection concerning for pneumonia.\"  DC summary - \"   Community acquired pneumonia of left lower lobe of lung:   Reports feeling much better today.  Remains on room air.  Remains afebrile   with no leukocytosis.  Repeat chest x-ray with improved aeration and no new   consolidation. Will discharge on oral antibiotics for 3 more days.\"  Treatment: IV Levaquin transitioned to po on discharge, labs, cultures, cxray,   CT, essential home meds, supportive care    Thank you,  Bubba Quinonez RN,BSB  Options provided:  -- Gram negative pneumonia  -- Other - I will add my own diagnosis  -- Disagree - Not applicable / Not valid  -- Disagree - Clinically unable to determine / Unknown  -- Refer to Clinical Documentation Reviewer    PROVIDER RESPONSE TEXT:    This patient has gram negative pneumonia.    Query created by: Bubba Quinonez on 2025 9:35 AM      Electronically signed by:  Alem Talavera MD 2025 4:08 PM

## 2025-05-05 ENCOUNTER — APPOINTMENT (OUTPATIENT)
Dept: CT IMAGING | Age: 60
End: 2025-05-05
Payer: MEDICARE

## 2025-05-05 ENCOUNTER — HOSPITAL ENCOUNTER (EMERGENCY)
Age: 60
Discharge: HOME | End: 2025-05-05
Payer: MEDICARE

## 2025-05-05 ENCOUNTER — APPOINTMENT (OUTPATIENT)
Dept: GENERAL RADIOLOGY | Age: 60
End: 2025-05-05
Payer: MEDICARE

## 2025-05-05 VITALS
TEMPERATURE: 98 F | WEIGHT: 189 LBS | SYSTOLIC BLOOD PRESSURE: 131 MMHG | HEART RATE: 61 BPM | OXYGEN SATURATION: 99 % | BODY MASS INDEX: 43.74 KG/M2 | RESPIRATION RATE: 15 BRPM | DIASTOLIC BLOOD PRESSURE: 79 MMHG | HEIGHT: 55 IN

## 2025-05-05 DIAGNOSIS — N83.209 CYST OF OVARY, UNSPECIFIED LATERALITY: ICD-10-CM

## 2025-05-05 DIAGNOSIS — N39.0 URINARY TRACT INFECTION WITHOUT HEMATURIA, SITE UNSPECIFIED: Primary | ICD-10-CM

## 2025-05-05 DIAGNOSIS — K42.9 UMBILICAL HERNIA WITHOUT OBSTRUCTION AND WITHOUT GANGRENE: ICD-10-CM

## 2025-05-05 LAB
ALBUMIN SERPL-MCNC: 3.9 G/DL (ref 3.4–5)
ALBUMIN/GLOB SERPL: 1 {RATIO} (ref 1.1–2.2)
ALP SERPL-CCNC: 105 U/L (ref 40–129)
ALT SERPL-CCNC: 41 U/L (ref 10–40)
ANION GAP SERPL CALCULATED.3IONS-SCNC: 8 MMOL/L (ref 3–16)
AST SERPL-CCNC: 44 U/L (ref 15–37)
BACTERIA URNS QL MICRO: ABNORMAL /HPF
BASOPHILS # BLD: 0.1 K/UL (ref 0–0.2)
BASOPHILS NFR BLD: 1.1 %
BILIRUB SERPL-MCNC: <0.2 MG/DL (ref 0–1)
BILIRUB UR QL STRIP.AUTO: NEGATIVE
BUN SERPL-MCNC: 18 MG/DL (ref 7–20)
CALCIUM SERPL-MCNC: 10 MG/DL (ref 8.3–10.6)
CHLORIDE SERPL-SCNC: 103 MMOL/L (ref 99–110)
CHP ED QC CHECK: YES
CLARITY UR: CLEAR
CO2 SERPL-SCNC: 28 MMOL/L (ref 21–32)
COLOR UR: YELLOW
CREAT SERPL-MCNC: 0.9 MG/DL (ref 0.6–1.1)
DEPRECATED RDW RBC AUTO: 14.1 % (ref 12.4–15.4)
EKG ATRIAL RATE: 69 BPM
EKG DIAGNOSIS: NORMAL
EKG P AXIS: 41 DEGREES
EKG P-R INTERVAL: 130 MS
EKG Q-T INTERVAL: 424 MS
EKG QRS DURATION: 88 MS
EKG QTC CALCULATION (BAZETT): 454 MS
EKG R AXIS: 42 DEGREES
EKG T AXIS: 36 DEGREES
EKG VENTRICULAR RATE: 69 BPM
EOSINOPHIL # BLD: 0.4 K/UL (ref 0–0.6)
EOSINOPHIL NFR BLD: 5.8 %
EPI CELLS #/AREA URNS AUTO: 0 /HPF (ref 0–5)
GFR SERPLBLD CREATININE-BSD FMLA CKD-EPI: 73 ML/MIN/{1.73_M2}
GLUCOSE BLD-MCNC: 89 MG/DL
GLUCOSE BLD-MCNC: 89 MG/DL (ref 70–99)
GLUCOSE SERPL-MCNC: 65 MG/DL (ref 70–99)
GLUCOSE UR STRIP.AUTO-MCNC: NEGATIVE MG/DL
HCT VFR BLD AUTO: 37 % (ref 36–48)
HGB BLD-MCNC: 12.4 G/DL (ref 12–16)
HGB UR QL STRIP.AUTO: NEGATIVE
HYALINE CASTS #/AREA URNS AUTO: 0 /LPF (ref 0–8)
KETONES UR STRIP.AUTO-MCNC: NEGATIVE MG/DL
LEUKOCYTE ESTERASE UR QL STRIP.AUTO: ABNORMAL
LIPASE SERPL-CCNC: 26 U/L (ref 13–60)
LYMPHOCYTES # BLD: 1.4 K/UL (ref 1–5.1)
LYMPHOCYTES NFR BLD: 17.8 %
MCH RBC QN AUTO: 30.2 PG (ref 26–34)
MCHC RBC AUTO-ENTMCNC: 33.5 G/DL (ref 31–36)
MCV RBC AUTO: 90 FL (ref 80–100)
MONOCYTES # BLD: 0.6 K/UL (ref 0–1.3)
MONOCYTES NFR BLD: 8.2 %
NEUTROPHILS # BLD: 5.1 K/UL (ref 1.7–7.7)
NEUTROPHILS NFR BLD: 67.1 %
NITRITE UR QL STRIP.AUTO: POSITIVE
PERFORMED ON: NORMAL
PH UR STRIP.AUTO: 7.5 [PH] (ref 5–8)
PLATELET # BLD AUTO: 308 K/UL (ref 135–450)
PMV BLD AUTO: 7.2 FL (ref 5–10.5)
POTASSIUM SERPL-SCNC: 4.8 MMOL/L (ref 3.5–5.1)
PROT SERPL-MCNC: 7.9 G/DL (ref 6.4–8.2)
PROT UR STRIP.AUTO-MCNC: NEGATIVE MG/DL
RBC # BLD AUTO: 4.11 M/UL (ref 4–5.2)
RBC CLUMPS #/AREA URNS AUTO: 0 /HPF (ref 0–4)
SODIUM SERPL-SCNC: 139 MMOL/L (ref 136–145)
SP GR UR STRIP.AUTO: >=1.03 (ref 1–1.03)
TROPONIN, HIGH SENSITIVITY: 10 NG/L (ref 0–14)
TROPONIN, HIGH SENSITIVITY: 11 NG/L (ref 0–14)
UA COMPLETE W REFLEX CULTURE PNL UR: YES
UA DIPSTICK W REFLEX MICRO PNL UR: YES
URN SPEC COLLECT METH UR: ABNORMAL
UROBILINOGEN UR STRIP-ACNC: 0.2 E.U./DL
WBC # BLD AUTO: 7.7 K/UL (ref 4–11)
WBC #/AREA URNS AUTO: 40 /HPF (ref 0–5)

## 2025-05-05 PROCEDURE — 84484 ASSAY OF TROPONIN QUANT: CPT

## 2025-05-05 PROCEDURE — 93005 ELECTROCARDIOGRAM TRACING: CPT | Performed by: STUDENT IN AN ORGANIZED HEALTH CARE EDUCATION/TRAINING PROGRAM

## 2025-05-05 PROCEDURE — 71045 X-RAY EXAM CHEST 1 VIEW: CPT

## 2025-05-05 PROCEDURE — 81001 URINALYSIS AUTO W/SCOPE: CPT

## 2025-05-05 PROCEDURE — 87086 URINE CULTURE/COLONY COUNT: CPT

## 2025-05-05 PROCEDURE — 6370000000 HC RX 637 (ALT 250 FOR IP): Performed by: PHYSICIAN ASSISTANT

## 2025-05-05 PROCEDURE — 80053 COMPREHEN METABOLIC PANEL: CPT

## 2025-05-05 PROCEDURE — 99285 EMERGENCY DEPT VISIT HI MDM: CPT

## 2025-05-05 PROCEDURE — 87186 SC STD MICRODIL/AGAR DIL: CPT

## 2025-05-05 PROCEDURE — 6360000004 HC RX CONTRAST MEDICATION: Performed by: PHYSICIAN ASSISTANT

## 2025-05-05 PROCEDURE — 70450 CT HEAD/BRAIN W/O DYE: CPT

## 2025-05-05 PROCEDURE — 93010 ELECTROCARDIOGRAM REPORT: CPT | Performed by: INTERNAL MEDICINE

## 2025-05-05 PROCEDURE — 85025 COMPLETE CBC W/AUTO DIFF WBC: CPT

## 2025-05-05 PROCEDURE — 83690 ASSAY OF LIPASE: CPT

## 2025-05-05 PROCEDURE — 74177 CT ABD & PELVIS W/CONTRAST: CPT

## 2025-05-05 PROCEDURE — 87077 CULTURE AEROBIC IDENTIFY: CPT

## 2025-05-05 RX ORDER — ONDANSETRON 4 MG/1
4-8 TABLET, ORALLY DISINTEGRATING ORAL EVERY 12 HOURS PRN
Qty: 20 TABLET | Refills: 0 | Status: SHIPPED | OUTPATIENT
Start: 2025-05-05

## 2025-05-05 RX ORDER — CEFUROXIME AXETIL 250 MG/1
500 TABLET ORAL ONCE
Status: COMPLETED | OUTPATIENT
Start: 2025-05-05 | End: 2025-05-05

## 2025-05-05 RX ORDER — CEFUROXIME AXETIL 500 MG/1
500 TABLET ORAL 2 TIMES DAILY
Qty: 14 TABLET | Refills: 0 | Status: SHIPPED | OUTPATIENT
Start: 2025-05-05 | End: 2025-05-12

## 2025-05-05 RX ADMIN — CEFUROXIME AXETIL 500 MG: 250 TABLET ORAL at 18:35

## 2025-05-05 RX ADMIN — IOHEXOL 75 ML: 350 INJECTION, SOLUTION INTRAVENOUS at 14:39

## 2025-05-05 ASSESSMENT — ENCOUNTER SYMPTOMS
DIARRHEA: 0
NAUSEA: 0
ABDOMINAL PAIN: 1
RHINORRHEA: 0
WHEEZING: 0
SHORTNESS OF BREATH: 0
VOMITING: 0
COUGH: 0

## 2025-05-05 ASSESSMENT — PAIN SCALES - GENERAL: PAINLEVEL_OUTOF10: 10

## 2025-05-05 ASSESSMENT — PAIN DESCRIPTION - LOCATION: LOCATION: ABDOMEN;CHEST

## 2025-05-05 ASSESSMENT — PAIN DESCRIPTION - PAIN TYPE: TYPE: ACUTE PAIN

## 2025-05-05 ASSESSMENT — PAIN - FUNCTIONAL ASSESSMENT: PAIN_FUNCTIONAL_ASSESSMENT: 0-10

## 2025-05-05 NOTE — ED PROVIDER NOTES
The Ekg interpreted by me in the absence of a cardiologist shows.  Normal sinus rhythm with a ventricular rate of 69.  Axis normal.  QTc appropriate.  No specific ST or T wave abnormality.  No significant change from prior 1-.      I only performed EKG interpretation and was not otherwise involved in the care of this patient.       Oseas Wayne MD  05/05/25 8642    
110/72 123/67 131/79   Pulse: 71 62 61   Resp: 16 19 15   Temp: 98 °F (36.7 °C)     TempSrc: Oral     SpO2: 100%  99%   Weight: 85.7 kg (189 lb)     Height: 1.372 m (4' 6\")         Is this patient to be included in the SEP-1 Core Measure due to severe sepsis or septic shock?   No   Exclusion criteria - the patient is NOT to be included for SEP-1 Core Measure due to:  Infection is not suspected    Patient was given the following medications:  Medications   cefUROXime (CEFTIN) tablet 500 mg (has no administration in time range)   iohexol (OMNIPAQUE 350) solution 75 mL (75 mLs IntraVENous Given 5/5/25 9899)             Chronic Conditions affecting care:    has a past medical history of Allergic rhinitis, Alopecia, Anterolisthesis of lumbar spine, Anxiety, Asthma, Constipation, Depression, GERD (gastroesophageal reflux disease), Hyperopia, Hypertension, Insomnia, Mild intellectual disability, Obstructive sleep apnea, Osteoarthritis of both knees, Osteopenia, Ovarian cyst, Overactive bladder, Periumbilical hernia, Presbyopia, and Psychotic disorder (HCC).    CONSULTS: (Who and What was discussed)  None      Social Determinants Significantly Affecting Health : None    Records Reviewed (External, Source and Summary) n/a    CC/HPI Summary, DDx, ED Course, and Reassessment: Patient presents for evaluation of concern for abnormal blood pressures as well as generalized fatigue, abdominal pain, chest pain, headaches etc.  On exam, she is resting comfortably in bed no acute distress and nontoxic.  She is alert and oriented. Not the worst headache of her life, sudden onset or thunderclap in nature.  Lungs are clear to auscultation bilaterally, chest is nontender and abdomen is benign with no focal reproducible tenderness or peritoneal signs.  She declined need for any pain medication and will be reevaluated.  Please see attending note for EKG interpretation.    Disposition Considerations (tests considered but not done, Admit vs

## 2025-05-05 NOTE — ED TRIAGE NOTES
Pt arrives via ems for eval of abnormal blood pressures, low this morning so they held her meds, now high. C/o of abdominal pain onset today.  Pt sts chest pain present as well. Pt sts she feels dizzy.  Pt is a/o per her norm, rsp nonlabored and skin race appropriate, warm and dry

## 2025-05-07 LAB
BACTERIA UR CULT: ABNORMAL
ORGANISM: ABNORMAL

## 2025-07-20 ENCOUNTER — HOSPITAL ENCOUNTER (EMERGENCY)
Age: 60
Discharge: HOME | End: 2025-07-21
Attending: STUDENT IN AN ORGANIZED HEALTH CARE EDUCATION/TRAINING PROGRAM
Payer: MEDICARE

## 2025-07-20 ENCOUNTER — APPOINTMENT (OUTPATIENT)
Dept: GENERAL RADIOLOGY | Age: 60
End: 2025-07-20
Payer: MEDICARE

## 2025-07-20 VITALS
TEMPERATURE: 98.4 F | HEIGHT: 55 IN | DIASTOLIC BLOOD PRESSURE: 64 MMHG | WEIGHT: 203 LBS | SYSTOLIC BLOOD PRESSURE: 126 MMHG | BODY MASS INDEX: 46.98 KG/M2 | OXYGEN SATURATION: 99 % | RESPIRATION RATE: 20 BRPM | HEART RATE: 69 BPM

## 2025-07-20 DIAGNOSIS — U07.1 COVID-19: Primary | ICD-10-CM

## 2025-07-20 LAB
FLUAV RNA RESP QL NAA+PROBE: NOT DETECTED
FLUBV RNA RESP QL NAA+PROBE: NOT DETECTED
SARS-COV-2 RNA RESP QL NAA+PROBE: DETECTED

## 2025-07-20 PROCEDURE — 6370000000 HC RX 637 (ALT 250 FOR IP): Performed by: NURSE PRACTITIONER

## 2025-07-20 PROCEDURE — 87636 SARSCOV2 & INF A&B AMP PRB: CPT

## 2025-07-20 PROCEDURE — 6370000000 HC RX 637 (ALT 250 FOR IP): Performed by: STUDENT IN AN ORGANIZED HEALTH CARE EDUCATION/TRAINING PROGRAM

## 2025-07-20 PROCEDURE — 6360000002 HC RX W HCPCS: Performed by: NURSE PRACTITIONER

## 2025-07-20 PROCEDURE — 71045 X-RAY EXAM CHEST 1 VIEW: CPT

## 2025-07-20 PROCEDURE — 94640 AIRWAY INHALATION TREATMENT: CPT

## 2025-07-20 PROCEDURE — 99284 EMERGENCY DEPT VISIT MOD MDM: CPT

## 2025-07-20 RX ORDER — AZITHROMYCIN 250 MG/1
500 TABLET, FILM COATED ORAL ONCE
Status: COMPLETED | OUTPATIENT
Start: 2025-07-20 | End: 2025-07-20

## 2025-07-20 RX ORDER — IBUPROFEN 800 MG/1
800 TABLET, FILM COATED ORAL ONCE
Status: COMPLETED | OUTPATIENT
Start: 2025-07-20 | End: 2025-07-20

## 2025-07-20 RX ORDER — ALBUTEROL SULFATE 0.83 MG/ML
5 SOLUTION RESPIRATORY (INHALATION) ONCE
Status: COMPLETED | OUTPATIENT
Start: 2025-07-20 | End: 2025-07-20

## 2025-07-20 RX ORDER — AZITHROMYCIN 250 MG/1
TABLET, FILM COATED ORAL
Qty: 6 TABLET | Refills: 0 | Status: SHIPPED | OUTPATIENT
Start: 2025-07-20 | End: 2025-07-30

## 2025-07-20 RX ORDER — IPRATROPIUM BROMIDE AND ALBUTEROL SULFATE 2.5; .5 MG/3ML; MG/3ML
1 SOLUTION RESPIRATORY (INHALATION) ONCE
Status: COMPLETED | OUTPATIENT
Start: 2025-07-20 | End: 2025-07-20

## 2025-07-20 RX ADMIN — IPRATROPIUM BROMIDE AND ALBUTEROL SULFATE 1 DOSE: .5; 3 SOLUTION RESPIRATORY (INHALATION) at 23:09

## 2025-07-20 RX ADMIN — AZITHROMYCIN DIHYDRATE 500 MG: 250 TABLET ORAL at 23:16

## 2025-07-20 RX ADMIN — ALBUTEROL SULFATE 5 MG: 2.5 SOLUTION RESPIRATORY (INHALATION) at 23:09

## 2025-07-20 RX ADMIN — IBUPROFEN 800 MG: 800 TABLET, FILM COATED ORAL at 22:44

## 2025-07-20 ASSESSMENT — PAIN - FUNCTIONAL ASSESSMENT: PAIN_FUNCTIONAL_ASSESSMENT: NONE - DENIES PAIN

## 2025-07-20 ASSESSMENT — PAIN DESCRIPTION - LOCATION: LOCATION: CHEST

## 2025-07-20 ASSESSMENT — PAIN DESCRIPTION - DESCRIPTORS: DESCRIPTORS: ACHING

## 2025-07-20 ASSESSMENT — PAIN SCALES - GENERAL: PAINLEVEL_OUTOF10: 10

## 2025-07-21 NOTE — DISCHARGE INSTRUCTIONS
It is mandatory that you follow up with your primary care provider to ensure resolution/improvement of your symptoms.  If you do not have a primary care provider, please see discharge paperwork for instructions to contact [facility], who are currently accepting new patients.    MANDATORY return to the emergency department within 12-24 hours unless you are better.  If you feel worse or have any other concerns, return sooner.    If you experience any of the red flag signs/symptoms that we discussed, please return to the emergency department or call 911 immediately.    Please take medications as we discussed.

## 2025-07-21 NOTE — ED NOTES
Report given to [facility] team. All questions and concerns answered at this time. Pt awake, alert and oriented. Respirations even and unlabored on room air.

## 2025-07-21 NOTE — ED PROVIDER NOTES
EMERGENCY DEPARTMENT PROVIDER NOTE         PATIENT IDENTIFICATION     Name:   Aster Jauregui  MRN:   [id number]  YOB: 1965  Date of Evaluation:   7/20/2025  Provider:   Yessica Albright NP; Marco Antonio Downing DO  PCP:   No primary care provider on file.        CHIEF COMPLAINT     Cough (Pt to ED from [facility] via  ems with c/o cough x1 week. Pt reports dry cough, tried mucinex with no relief. Facility states covid has been going around.)        HISTORY OF PRESENT ILLNESS     I independently interviewed patient and/or caretaker(s).  See Advanced Practice Provider (EDDI) note for full HPI.  In summary, Aster Jauregui  is a(n) 59 y.o. female who presents with cough, body aches, and chills for the last week or so.        PHYSICAL EXAM     I reviewed physical exam performed and documented by EDDI.  I performed an independent physical examination with findings as follows:  Overall well-appearing female with no respiratory distress on room air.  No cough throughout duration of examination.        LAB RESULTS     Results for orders placed or performed during the hospital encounter of 07/20/25   COVID-19 & Influenza Combo    Specimen: Nasopharyngeal Swab   Result Value Ref Range    SARS-CoV-2 RNA, RT PCR DETECTED (A) NOT DETECTED    Influenza A NOT DETECTED NOT DETECTED    Influenza B NOT DETECTED NOT DETECTED         IMAGING RESULTS     XR CHEST PORTABLE   Final Result   Stable mild cardiomegaly with resolving central pulmonary congestion.      Mild bibasilar atelectasis or early infiltrates which is more prominent.         Any applicable radiology studies including x-ray, CT, MRI, and/or ultrasound were reviewed independently by me in addition to the radiologist.  I reviewed all radiology images and reports as well from this evaluation.        MEDICAL DECISION MAKING     In addition to the advanced practice provider, I personally saw Aster Jauregui and performed a substantive portion of 
found.    PROCEDURES   Unless otherwise noted below, none     Procedures      CRITICAL CARE TIME (.cctime)   none    PAST MEDICAL HISTORY      has a past medical history of Allergic rhinitis, Alopecia, Anterolisthesis of lumbar spine, Anxiety, Asthma, Constipation, Depression, GERD (gastroesophageal reflux disease), Hyperopia, Hypertension, Insomnia, Mild intellectual disability, Obstructive sleep apnea, Osteoarthritis of both knees, Osteopenia, Ovarian cyst, Overactive bladder, Periumbilical hernia, Presbyopia, and Psychotic disorder (HCC).     EMERGENCY DEPARTMENT COURSE and DIFFERENTIAL DIAGNOSIS/MDM:   Vitals:    Vitals:    07/20/25 2104   BP: 126/64   Pulse: 69   Resp: 20   Temp: 98.4 °F (36.9 °C)   TempSrc: Oral   SpO2: 99%   Weight: 92.1 kg (203 lb)   Height: 1.372 m (4' 6\")       Is this patient to be included in the SEP-1 Core Measure due to severe sepsis or septic shock?   No   Exclusion criteria - the patient is NOT to be included for SEP-1 Core Measure due to:  Viral etiology found or highly suspected (including COVID-19) without concomitant bacterial infection    Patient was given the following medications:  Medications   ipratropium 0.5 mg-albuterol 2.5 mg (DUONEB) nebulizer solution 1 Dose (1 Dose Inhalation Given 7/20/25 2309)   albuterol (PROVENTIL) (2.5 MG/3ML) 0.083% nebulizer solution 5 mg (5 mg Nebulization Given 7/20/25 2309)   ibuprofen (ADVIL;MOTRIN) tablet 800 mg (800 mg Oral Given 7/20/25 2244)   azithromycin (ZITHROMAX) tablet 500 mg (500 mg Oral Given 7/20/25 2316)       ED Course as of 07/21/25 0258   Sun Jul 20, 2025 2218 SARS-CoV-2 RNA, RT PCR(!): DETECTED [PB]   2310 XR CHEST PORTABLE  Mild bibasilar infiltrates consistent with atelectasis, but cannot rule out early infiltrates.  Will give azithromycin. [PB]   2316 Patient was revisited at bedside.  Remains in stable condition.  Discussed all results and plan for discharge including continued home care and follow-up with patient's